# Patient Record
Sex: MALE | Race: WHITE | NOT HISPANIC OR LATINO | Employment: FULL TIME | URBAN - METROPOLITAN AREA
[De-identification: names, ages, dates, MRNs, and addresses within clinical notes are randomized per-mention and may not be internally consistent; named-entity substitution may affect disease eponyms.]

---

## 2017-02-03 ENCOUNTER — OFFICE VISIT (OUTPATIENT)
Dept: SLEEP MEDICINE | Facility: CLINIC | Age: 37
End: 2017-02-03
Attending: NURSE PRACTITIONER
Payer: COMMERCIAL

## 2017-02-03 VITALS
HEART RATE: 90 BPM | OXYGEN SATURATION: 96 % | HEIGHT: 74 IN | BODY MASS INDEX: 31.02 KG/M2 | DIASTOLIC BLOOD PRESSURE: 83 MMHG | RESPIRATION RATE: 16 BRPM | SYSTOLIC BLOOD PRESSURE: 131 MMHG | WEIGHT: 241.7 LBS

## 2017-02-03 DIAGNOSIS — E66.811 OBESITY (BMI 30.0-34.9): ICD-10-CM

## 2017-02-03 DIAGNOSIS — G47.50 PARASOMNIA: ICD-10-CM

## 2017-02-03 DIAGNOSIS — G47.26 SHIFT WORK SLEEP DISORDER: ICD-10-CM

## 2017-02-03 DIAGNOSIS — R06.83 SNORING: Primary | ICD-10-CM

## 2017-02-03 DIAGNOSIS — F45.8 TEETH GRINDING: ICD-10-CM

## 2017-02-03 PROCEDURE — 99211 OFF/OP EST MAY X REQ PHY/QHP: CPT | Mod: ZF

## 2017-02-03 NOTE — PROGRESS NOTES
"Allergies:    Allergies   Allergen Reactions     No Known Drug Allergies        Medications:    Current Outpatient Prescriptions   Medication Sig Dispense Refill     venlafaxine (EFFEXOR-ER) 75 MG TB24 Take 75 mg by mouth daily (with breakfast)         Problem List:  Patient Active Problem List    Diagnosis Date Noted     Essential and other specified forms of tremor 09/09/2003     Concussion 02/03/2003     Problem list name updated by automated process. Provider to review          Past Medical/Surgical History:  Past Medical History   Diagnosis Date     NO ACTIVE PROBLEMS      TMJ (dislocation of temporomandibular joint)      Depressive disorder      No past surgical history on file.        Physical Examination:  Vitals: /83 mmHg  Pulse 90  Resp 16  Ht 1.88 m (6' 2\")  Wt 109.634 kg (241 lb 11.2 oz)  BMI 31.02 kg/m2  SpO2 96%  BMI= Body mass index is 31.02 kg/(m^2).         [place] Total Score 2/3/2017   Total score - [place] 10       GENERAL APPEARANCE: healthy, alert and no distress  EYES: Eyes grossly normal to inspection  HENT: oropharynx crowded, soft palate dependent and nares patent  NECK: thyroid normal to palpation  RESP: lungs clear to auscultation - no rales, rhonchi or wheezes  CV: regular rates and rhythm, normal S1 S2, no S3 or S4 and no murmur, click or rub  Musculoskeletal:  Mallampati Class: IV.  Tonsillar Stage: 1  hidden by pillars.  Dental:         CC: No ref. provider found          "

## 2017-02-03 NOTE — MR AVS SNAPSHOT
"              After Visit Summary   2/3/2017    Burt Graham    MRN: [id number]           Patient Information     Date Of Birth          1980        Visit Information        Provider Department      2/3/2017 10:00 AM Anamaria De Anda APRN [facility], [place], Sleep Study        Today's Diagnoses     Snoring    -  1       Care Instructions    MY TREATMENT INFORMATION FOR SLEEP APNEA-  Burt Graham    NP : Anamaria De Anda  SLEEP CENTER :   [facility]   MY CONTACT NUMBER: [phone]    Home sleep study  Positional device after study  Follow up with me, discuss results and tx options      If I haven't had a sleep study yet, what can I expect?  A personal story from [employer]  https://www.[employer].com/watch?v=AxPLmlRpnCs    Am I having a home sleep study?  Here is a video in case you get home and want to make sure you have done it correctly  https://www.[employer].com/watch?v=NQC9W6qTjj1&feature=youtu.be    Frequently asked questions:  1. What is Obstructive Sleep Apnea (GWENDOLYN)? GWENDOLYN is the most common type of sleep apnea. Apnea literally means, \"without breath.\" It is characterized by repetitive pauses in breathing, despite continued effort to breathe, and is usually associated with a reduction in blood oxygen saturation. Apneas can last 10 to over 60 seconds. It is caused by narrowing or collapse of the upper airway as muscles relax during sleep. Severity of sleep apnea is determined by frequency of breathing events and their effect on your sleep and oxygen levels determined during sleep testing.   2. What are the consequences of GWENDOLYN? Symptoms include: daytime sleepiness- possibly increasing the risk of falling asleep while driving, unrefreshing/restless sleep, snoring, insomnia, waking frequently to urinate, waking with heartburn or reflux, reduced concentration and memory, and morning headaches. Other health consequences may include development of high blood pressure and other cardiovascular disease in " persons who are susceptible. Untreated GWENDOLYN  can contribute to heart disease, stroke and diabetes.   3. What are the treatment options? In most situations, sleep apnea is a lifelong disease that must be managed with daily therapy. Medications are not effective for sleep apnea and surgery is generally not performed until other therapies have been tried. Therapy is usually tailored to the individual patient based on many factors including your wishes as well as severity of sleep apnea and severity of obesity. Continuous Positive Airway (CPAP) is the most reliable treatment. An oral device to hold your jaw forward is usually the next most reliable option. Other options include postioning devices (to keep you off your back), weight loss, and surgery including a tongue pacing device. There is more detail about some of these options below.            1. CPAP-  WHAT DOES IT DO AND HOW CAN I LEARN TO WEAR IT?                               BEFORE I START, CAN I WATCH A MOVIE TO GET A PLAN ON HOW TO USE CPAP?  https://www.[employer].com/watch?s=c6W48id745G      Continuous positive airway pressure, or CPAP, is the most effective treatment for obstructive sleep apnea. It works by blowing room air, through a mask, to hold your throat open. A decision to use CPAP is a major step forward in the pursuit of a healthier life. The successful use of CPAP will help you breathe easier, sleep better and live healthier. You can choose CPAP equipment from any durable medical equipment provider that meets your needs.  Using CPAP can be a positive experience if you keep these sampson points in mind:  1. Commitment  CPAP is not a quick fix for your problem. It involves a long-term commitment to improve your sleep and your health.    2. Communication  Stay in close communication with both your sleep doctor and your CPAP supplier. Ask lots of questions and seek help when you need it.    3. Consistency  Use CPAP all night, every night and for every nap.  "You will receive the maximum health benefits from CPAP when you use it every time that you sleep. This will also make it easier for your body to adjust to the treatment.    4. Correction  The first machine and mask that you try may not be the best ones for you. Work with your sleep doctor and your CPAP supplier to make corrections to your equipment selection. Ask about trying a different type of machine or mask if you have ongoing problems. Make sure that your mask is a good fit and learn to use your equipment properly.    5. Challenge  Tell a family member or close friend to ask you each morning if you used your CPAP the previous night. Have someone to challenge you to give it your best effort.    6. Connection   Your adjustment to CPAP will be easier if you are able to connect with others who use the same treatment. Ask your sleep doctor if there is a support group in your area for people who have sleep apnea, or look for one on the Internet.  7. Comfort   Increase your level of comfort by using a saline spray, decongestant or heated humidifier if CPAP irritates your nose, mouth or throat. Use your unit's \"ramp\" setting to slowly get used to the air pressure level. There may be soft pads you can buy that will fit over your mask straps. Look on www.CPAP.com for accessories that can help make CPAP use more comfortable.  8. Cleaning   Clean your mask, tubing and headgear on a regular basis. Put this time in your schedule so that you don't forget to do it. Check and replace the filters for your CPAP unit and humidifier.    9. Completion   Although you are never finished with CPAP therapy, you should reward yourself by celebrating the completion of your first month of treatment. Expect this first month to be your hardest period of adjustment. It will involve some trial and error as you find the machine, mask and pressure settings that are right for you.    10. Continuation  After your first month of treatment, continue " to make a daily commitment to use your CPAP all night, every night and for every nap.    CPAP-Tips to starting with success:  Begin using your CPAP for short periods of time during the day while you watch TV or read.    Use CPAP every night and for every nap. Using it less often reduces the health benefits and makes it harder for your body to get used to it.    Make small adjustments to your mask, tubing, straps and headgear until you get the right fit. Tightening the mask may actually worsen the leak.  If it leaves significant marks on your face or irritates the bridge of your nose, it may not be the best mask for you.  Speak with the person who supplied the mask and consider trying other masks. Insurances will allow you to try different masks during the first month of starting CPAP.  Insurance also covers a new mask, hose and filter about every 6 months.    Use a saline nasal spray to ease mild nasal congestion. Neti-Pot or saline nasal rinses may also help. Nasal gel sprays can help reduce nasal dryness.  Biotene mouthwash can be helpful to protect your teeth if you experience frequent dry mouth.  Dry mouth may be a sign of air escaping out of your mouth or out of the mask in the case of a full face mask.  Speak with your provider if you expect that is the case.     Take a nasal decongestant to relieve more severe nasal or sinus congestion.  Do not use Afrin (oxymetazoline) nasal spray more than 3 days in a row.  Speak with your sleep doctor if your nasal congestion is chronic.    Use a heated humidifier that fits your CPAP model to enhance your breathing comfort. Adjust the heat setting up if you get a dry nose or throat, down if you get condensation in the hose or mask.  Position the CPAP lower than you so that any condensation in the hose drains back into the machine rather than towards the mask.    Try a system that uses nasal pillows if traditional masks give you problems.    Clean your mask, tubing and  headgear once a week. Make sure the equipment dries fully.    Regularly check and replace the filters for your CPAP unit and humidifier.    Work closely with your sleep provider and your CPAP supplier to make sure that you have the machine, mask and air pressure setting that works best for you. It is better to stop using it and call your provider to solve problems than to lay awake all night frustrated with the device.    BESIDES CPAP, WHAT OTHER THERAPIES ARE THERE?      Positioning Device  Positioning devices are generally used when sleep apnea is mild and only occurs on your back.This example shows a pillow that straps around the waist. It may be appropriate for those whose sleep study shows milder sleep apnea that occurs primarily when lying flat on one's back. Preliminary studies have shown benefit but effectiveness at home may need to be verified by a home sleep test. These devices are generally not covered by medical insurance.                    Positioning Device  Positioning devices are generally used when sleep apnea is mild and only occurs on your back.This example shows a pillow that straps around the waist. It may be appropriate for those whose sleep study shows milder sleep apnea that occurs primarily when lying flat on one's back. Preliminary studies have shown benefit but effectiveness at home may need to be verified by a home sleep test. These devices are generally not covered by medical insurance.                ebay or backpack w/ chest strap stuffed w/ pillow or t shirt 2  pockets sew in tennis balls  Oral Appliance  What is oral appliance therapy?  An oral appliance is a small acrylic device that fits over the upper and lower teeth or tongue (similar to an orthodontic retainer or a mouth guard). This device slightly advances the lower jaw or tongue, which moves the base of the tongue forward, opens the airway, improves breathing and can effectively treat snoring and obstructive sleep apnea  sleep apnea. The appliance is fabricated and customized by a qualified dentist with experience in treating snoring and sleep apnea. Oral appliances are usually well tolerated and have relatively high compliance by patients1, 2, 3.  When is an oral appliance indicated?  Oral appliance therapy is recommended as a first-line treatment for patients with primary snoring, mild sleep apnea, and for patients with moderate sleep apnea who prefer appliance therapy to use of CPAP4, 5. Severity of sleep apnea is determined by sleep testing and is based on the number of respiratory events per hour of sleep.   How successful is oral appliance therapy?  The success rate of oral appliance therapy in patients with mild sleep apnea is 75-80% while in patients with moderate sleep apnea it is 50-70%. The chance of success in patients with severe sleep apnea is 40-50%. The research also shows that oral appliances have a beneficial effect on the cardiovascular health of GWENDOLYN patients at the same magnitude as CPAP therapy7.  Oral appliances should be a second-line treatment in cases of severe sleep apnea, but if not completely successful then a combination therapy utilizing CPAP plus oral appliance therapy may be effective. Oral appliances tend to be effective in a broad range of patients although studies show that the patients who have the highest success are females, younger patients, those with milder disease, and less severe obesity. 3, 6.   The chances of success are lower in patients who have more severe GWENDOLYN, are older, and those who are morbidly obese.     Example of an oral appliance   Finding a dentist that practices dental sleep medicine  Specific training is available through the American Academy of Dental Sleep Medicine for dentists interested in working in the field of sleep. To find a dentist who is educated in the field of sleep and the use of oral appliances, near you, visit the Web site of the American Academy of Dental  Sleep Medicine; also see   http://www.accpstorage.org/newOrganization/patients/oralAppliances.pdf  To search for a dentist certified in these practices:  Http://aadsm.org/FindADentist.aspx?1  1. Madhavi et al. Objectively measured vs self-reported compliance during oral appliance therapy for sleep-disordered breathing. Chest 2013; 144(5): 9123-6448.  2. Ailin, et al. Objective measurement of compliance during oral appliance therapy for sleep-disordered breathing. Thorax 2013; 68(1): 91-96.  3. Radha et al. Mandibular advancement devices in 620 men and women with GWENDOLYN and snoring: tolerability and predictors of treatment success. Chest 2004; 125: 9385-1484.  4. Jossie et al. Oral appliances for snoring and GWENDOLYN: a review. Sleep 2006; 29: 244-262.  5. Nikki, et al. Oral appliance treatment for GWENDOLYN: an update. J Clin Sleep Med 2014; 10(2): 215-227.  6. Nile et al. Predictors of OSAH treatment outcome. J Dent Res 2007; 86: 6173-3556.      Weight Loss:    Weight management is a personal decision.  If you are interested in exploring weight loss strategies, the following discussion covers the impact on weight loss on sleep apnea and the approaches that may be successful.    Weight loss decreases severity of sleep apnea in most people with obesity. For those with mild obesity who have developed snoring with weight gain, even 15-30 pound weight loss can improve and occasionally eliminate sleep apnea.  Structured and life-long dietary and health habits are necessary to lose weight and keep healthier weight levels.     Though there may be significant health benefits from weight loss, long-term weight loss is very difficult to achieve- studies show success with dietary management in less than 10% of people. In addition, substantial weight loss may require years of dietary control and may be difficult if patients have severe obesity. In these cases, surgical management may be considered.  Finally, older  individuals who have tolerated obesity without health complications may be less likely to benefit from weight loss strategies.    Your BMI is Body mass index is 31.02 kg/(m^2).  Body mass index (BMI) is one way to tell whether you are at a healthy weight, overweight, or obese. It measures your weight in relation to your height.  A BMI of 18.5 to 24.9 is in the healthy range. A person with a BMI of 25 to 29.9 is considered overweight, and someone with a BMI of 30 or greater is considered obese. More than two-thirds of American adults are considered overweight or obese.  Being overweight or obese increases the risk for further weight gain. Excess weight may lead to heart disease and diabetes.  Creating and following plans for healthy eating and physical activity may help you improve your health.  Weight control is part of healthy lifestyle and includes exercise, emotional health, and healthy eating habits. Careful eating habits lifelong are the mainstay of weight control. Though there are significant health benefits from weight loss, long-term weight loss with diet alone may be very difficult to achieve- studies show long-term success with dietary management in less than 10% of people. Attaining a healthy weight may be especially difficult to achieve in those with severe obesity. In some cases, medications, devices and surgical management might be considered.  What can you do?  If you are overweight or obese and are interested in methods for weight loss, you should discuss this with your provider.     Consider reducing daily calorie intake by 500 calories.     Keep a food journal.     Avoiding skipping meals, consider cutting portions instead.    Diet combined with exercise helps maintain muscle while optimizing fat loss. Strength training is particularly important for building and maintaining muscle mass. Exercise helps reduce stress, increase energy, and improves fitness. Increasing exercise without diet control,  however, may not burn enough calories to loose weight.       Start walking three days a week 10-20 minutes at a time    Work towards walking thirty minutes five days a week     Eventually, increase the speed of your walking for 1-2 minutes at time    In addition, we recommend that you review healthy lifestyles and methods for weight loss available through the National Institutes of Health patient information sites:  http://win.niddk.nih.gov/publications/index.htm    And look into health and wellness programs that may be available through your health insurance provider, employer, local community center, or frandy club.    Weight management plan: Patient was referred to their PCP to discuss a diet and exercise plan.    Surgery:    Upper Airway Surgery for GWENDOLYN  Surgery for GWENDOLYN is a second-line treatment option in the management of sleep apnea.  Surgery should be considered for patients who are having a difficult time tolerating CPAP.    Surgery for GWENDOLYN is directed at areas that are responsible for narrowing or complete obstruction of the airway during sleep.  There are a wide range of procedures available to enlarge and/or stabilize the airway to prevent blockage of breathing in the three major areas where it can occur: the palate, tongue, and nasal regions.  Successful surgical treatment depends on the accurate identification of the factors responsible for obstructive sleep apnea in each person.  A personalized approach is required because there is no single treatment that works well for everyone.  Because of anatomic variation, consultation with an examination by a sleep surgeon is a critical first step in determining what surgical options are best for each patient.  In some cases, examination during sedation may be recommended in order to guide the selection of procedures.  Patients will be counseled about risks and benefits as well as the typical recovery course after surgery. Surgery is typically not a cure for a  person s GWENDOLYN.  However, surgery will often significantly improve one s GWENDOLYN severity (termed  success rate ).  Even in the absence of a cure, surgery will decrease the cardiovascular risk associated with OSA7; improve overall quality of life8 (sleepiness, functionality, sleep quality, etc).          Palate Procedures:  Patients with GWENDOLYN often have narrowing of their airway in the region of their tonsils and uvula.  The goals of palate procedures are to widen the airway in this region as well as to help the tissues resist collapse.  Modern palate procedure techniques focus on tissue conservation and soft tissue rearrangement, rather than tissue removal.  Often the uvula is preserved in this procedure. Residual sleep apnea is common in patient after pharyngoplasty with an average reduction in sleep apnea events of 33%2.      Tongue Procedures:  While patients are awake, the muscles that surround the throat are active and keep this region open for breathing. These muscles relax during sleep, allowing the tongue and other structures to collapse and block breathing.  There are several different tongue procedures available.  Selection of a tongue base procedure depends on characteristics seen on physical exam.  Generally, procedures are aimed at removing bulky tissues in this area or preventing the back of the tongue from falling back during sleep.  Success rates for tongue surgery range from 50-62%3.    Hypoglossal Nerve Stimulation:  Hypoglossal nerve stimulation has recently received approval from the [place] Food and Drug Administration for the treatment of obstructive sleep apnea.  This is based on research showing that the system was safe and effective in treating sleep apnea6.  Results showed that the median AHI score decreased 68%, from 29.3 to 9.0. This therapy uses an implant system that senses breathing patterns and delivers mild stimulation to airway muscles, which keeps the airway open during sleep.  The  system consists of three fully implanted components: a small generator (similar in size to a pacemaker), a breathing sensor, and a stimulation lead.  Using a small handheld remote, a patient turns the therapy on before bed and off upon awakening.    Candidates for this device must be greater than 22 years of age, have moderate to severe GWENDOLYN (AHI between 20-65), BMI less than 32, have tried CPAP/oral appliance without success, and have appropriate upper airway anatomy (determined by a sleep endoscopy performed by Dr. Araujo).    Hypoglossal Nerve Stimulation Pathway:    The sleep surgeon s office will work with the patient through the insurance prior-authorization process (including communications and appeals).    Nasal Procedures:  Nasal obstruction can interfere with nasal breathing during the day and night.  Studies have shown that relief of nasal obstruction can improve the ability of some patients to tolerate positive airway pressure therapy for obstructive sleep apnea1.  Treatment options include medications such as nasal saline, topical corticosteroid and antihistamine sprays, and oral medications such as antihistamines or decongestants. Non-surgical treatments can include external nasal dilators for selected patients. If these are not successful by themselves, surgery can improve the nasal airway either alone or in combination with these other options.      Combination Procedures:  Combination of surgical procedures and other treatments may be recommended, particularly if patients have more than one area of narrowing or persistent positional disease.  The success rate of combination surgery ranges from 66-80%2,3.      1. Cruz KEVIN. The Role of the Nose in Snoring and Obstructive Sleep Apnoea: An Update.  Eur Arch Otorhinolaryngol. 2011; 268: 1365-73.  2.  Rakel SM; Jes JA; Mary JR; Pallanch JF; Danette MCPHERSON; Marisela SIMMONS; Sonny DENIS. Surgical modifications of the upper airway for obstructive sleep apnea in  adults: a systematic review and meta-analysis. SLEEP 2010;33(10):7036-0491. Roderick JOHNSON. Hypopharyngeal surgery in obstructive sleep apnea: an evidence-based medicine review.  Arch Otolaryngol Head Neck Surg. 2006 Feb;132(2):206-13.  3. Damon SANABRIA, Shannan Y, Dontae LOUIS. The efficacy of anatomically based multilevel surgery for obstructive sleep apnea. Otolaryngol Head Neck Surg. 2003 Oct;129(4):327-35.  4. Kezirian E, Goldberg A. Hypopharyngeal Surgery in Obstructive Sleep Apnea: An Evidence-Based Medicine Review. Arch Otolaryngol Head Neck Surg. 2006 Feb;132(2):206-13.  5. Floyd CARIAS et al. Upper-Airway Stimulation for Obstructive Sleep Apnea.  N Engl J Med. 2014 Jan 9;370(2):139-49.  6. Roxi Y et al. Increased Incidence of Cardiovascular Disease in Middle-aged Men with Obstructive Sleep Apnea. Am J Respir Crit Care Med; 2002 166: 159-165  7. Trudy EM et al. Studying Life Effects and Effectiveness of Palatopharyngoplasty (SLEEP) study: Subjective Outcomes of Isolated Uvulopalatopharyngoplasty. Otolaryngol Head Neck Surg. 2011; 144: 623-631.              Your BMI is Body mass index is 31.02 kg/(m^2).  Weight management is a personal decision.  If you are interested in exploring weight loss strategies, the following discussion covers the approaches that may be successful. Body mass index (BMI) is one way to tell whether you are at a healthy weight, overweight, or obese. It measures your weight in relation to your height.  A BMI of 18.5 to 24.9 is in the healthy range. A person with a BMI of 25 to 29.9 is considered overweight, and someone with a BMI of 30 or greater is considered obese. More than two-thirds of American adults are considered overweight or obese.  Being overweight or obese increases the risk for further weight gain. Excess weight may lead to heart disease and diabetes.  Creating and following plans for healthy eating and physical activity may help you improve your health.  Weight control is part of healthy  lifestyle and includes exercise, emotional health, and healthy eating habits. Careful eating habits lifelong are the mainstay of weight control. Though there are significant health benefits from weight loss, long-term weight loss with diet alone may be very difficult to achieve- studies show long-term success with dietary management in less than 10% of people. Attaining a healthy weight may be especially difficult to achieve in those with severe obesity. In some cases, medications, devices and surgical management might be considered.  What can you do?  If you are overweight or obese and are interested in methods for weight loss, you should discuss this with your provider.     Consider reducing daily calorie intake by 500 calories.     Keep a food journal.     Avoiding skipping meals, consider cutting portions instead.    Diet combined with exercise helps maintain muscle while optimizing fat loss. Strength training is particularly important for building and maintaining muscle mass. Exercise helps reduce stress, increase energy, and improves fitness. Increasing exercise without diet control, however, may not burn enough calories to loose weight.       Start walking three days a week 10-20 minutes at a time    Work towards walking thirty minutes five days a week     Eventually, increase the speed of your walking for 1-2 minutes at time    In addition, we recommend that you review healthy lifestyles and methods for weight loss available through the National Institutes of Health patient information sites:  http://win.niddk.nih.gov/publications/index.htm    And look into health and wellness programs that may be available through your health insurance provider, employer, local community center, or frandy club.    Weight management plan: Patient was referred to their PCP to discuss a diet and exercise plan.            Follow-ups after your visit        Follow-up notes from your care team     Return for hst, follow up with  "me.      Your next 10 appointments already scheduled     Feb 15, 2017 11:00 AM   HST  with SLEEP STUDY RM 7   [facility]    [address] [place] [id number] [phone]              Future tests that were ordered for you today     Open Future Orders        Priority Expected Expires Ordered    HST-Home Sleep Apnea Test Routine  2017 2/3/2017            Who to contact     If you have questions or need follow up information about today's clinic visit or your schedule please contact [facility] directly at [phone].  Normal or non-critical lab and imaging results will be communicated to you by MyChart, letter or phone within 4 business days after the clinic has received the results. If you do not hear from us within 7 days, please contact the clinic through [employer] or phone. If you have a critical or abnormal lab result, we will notify you by phone as soon as possible.  Submit refill requests through [employer] or call your pharmacy and they will forward the refill request to us. Please allow 3 business days for your refill to be completed.          Additional Information About Your Visit        [employer] Information     [employer] lets you send messages to your doctor, view your test results, renew your prescriptions, schedule appointments and more. To sign up, go to [email or web address] . Click on \"Log in\" on the left side of the screen, which will take you to the Welcome page. Then click on \"Sign up Now\" on the right side of the page.     You will be asked to enter the access code listed below, as well as some personal information. Please follow the directions to create your username and password.     Your access code is: [email or web address]  Expires: 2017 10:59 AM     Your access code will  in 90 days. If you need help or a new code, please call your [place] clinic or [phone].        Care " "EveryWhere ID     This is your Care EveryWhere ID. This could be used by other organizations to access your [place] medical records  [id number]        Your Vitals Were     Pulse Respirations Height BMI (Body Mass Index) Pulse Oximetry       90 16 1.88 m (6' 2\") 31.02 kg/m2 96%        Blood Pressure from Last 3 Encounters:   02/03/17 131/83   11/07/15 122/83   12/11/03 126/72    Weight from Last 3 Encounters:   02/03/17 109.634 kg (241 lb 11.2 oz)   11/07/15 104.327 kg (230 lb)   12/11/03 89.177 kg (196 lb 9.6 oz)               Primary Care Provider Office Phone # Fax #    [facility] [phone]       [address] [place] [id number]        Thank you!     Thank you for choosing [facility]EP [facility]  for your care. Our goal is always to provide you with excellent care. Hearing back from our patients is one way we can continue to improve our services. Please take a few minutes to complete the written survey that you may receive in the mail after your visit with us. Thank you!             Your Updated Medication List - Protect others around you: Learn how to safely use, store and throw away your medicines at www.disposemymeds.org.          This list is accurate as of: 2/3/17 10:59 AM.  Always use your most recent med list.                   Brand Name Dispense Instructions for use    venlafaxine 75 MG Tb24 24 hr tablet    EFFEXOR-ER     Take 75 mg by mouth daily (with breakfast)         "

## 2017-02-03 NOTE — PATIENT INSTRUCTIONS
"MY TREATMENT INFORMATION FOR SLEEP APNEA-  Burt Graham    NP : Anamaria Lanza [place]  SLEEP CENTER :   [facility]   MY CONTACT NUMBER: [phone]    Home sleep study  Positional device after study  Follow up with me, discuss results and tx options      If I haven't had a sleep study yet, what can I expect?  A personal story from Red  https://www.Yappsa App Storeube.com/watch?v=AxPLmlRpnCs    Am I having a home sleep study?  Here is a video in case you get home and want to make sure you have done it correctly  https://www.Yappsa App Storeube.com/watch?v=PDC1D3pXpc1&feature=youtu.be    Frequently asked questions:  1. What is Obstructive Sleep Apnea (GWENDOLYN)? GWENDOLYN is the most common type of sleep apnea. Apnea literally means, \"without breath.\" It is characterized by repetitive pauses in breathing, despite continued effort to breathe, and is usually associated with a reduction in blood oxygen saturation. Apneas can last 10 to over 60 seconds. It is caused by narrowing or collapse of the upper airway as muscles relax during sleep. Severity of sleep apnea is determined by frequency of breathing events and their effect on your sleep and oxygen levels determined during sleep testing.   2. What are the consequences of GWENDOLYN? Symptoms include: daytime sleepiness- possibly increasing the risk of falling asleep while driving, unrefreshing/restless sleep, snoring, insomnia, waking frequently to urinate, waking with heartburn or reflux, reduced concentration and memory, and morning headaches. Other health consequences may include development of high blood pressure and other cardiovascular disease in persons who are susceptible. Untreated GWENDOLYN  can contribute to heart disease, stroke and diabetes.   3. What are the treatment options? In most situations, sleep apnea is a lifelong disease that must be managed with daily therapy. Medications are not effective for sleep apnea and surgery is generally not performed until other therapies have been tried. " Therapy is usually tailored to the individual patient based on many factors including your wishes as well as severity of sleep apnea and severity of obesity. Continuous Positive Airway (CPAP) is the most reliable treatment. An oral device to hold your jaw forward is usually the next most reliable option. Other options include postioning devices (to keep you off your back), weight loss, and surgery including a tongue pacing device. There is more detail about some of these options below.            1. CPAP-  WHAT DOES IT DO AND HOW CAN I LEARN TO WEAR IT?                               BEFORE I START, CAN I WATCH A MOVIE TO GET A PLAN ON HOW TO USE CPAP?  https://www.The [employer] Card.com/watch?o=q9S85cb769O      Continuous positive airway pressure, or CPAP, is the most effective treatment for obstructive sleep apnea. It works by blowing room air, through a mask, to hold your throat open. A decision to use CPAP is a major step forward in the pursuit of a healthier life. The successful use of CPAP will help you breathe easier, sleep better and live healthier. You can choose CPAP equipment from any durable medical equipment provider that meets your needs.  Using CPAP can be a positive experience if you keep these sampson points in mind:  1. Commitment  CPAP is not a quick fix for your problem. It involves a long-term commitment to improve your sleep and your health.    2. Communication  Stay in close communication with both your sleep doctor and your CPAP supplier. Ask lots of questions and seek help when you need it.    3. Consistency  Use CPAP all night, every night and for every nap. You will receive the maximum health benefits from CPAP when you use it every time that you sleep. This will also make it easier for your body to adjust to the treatment.    4. Correction  The first machine and mask that you try may not be the best ones for you. Work with your sleep doctor and your CPAP supplier to make corrections to your equipment  "selection. Ask about trying a different type of machine or mask if you have ongoing problems. Make sure that your mask is a good fit and learn to use your equipment properly.    5. Challenge  Tell a family member or close friend to ask you each morning if you used your CPAP the previous night. Have someone to challenge you to give it your best effort.    6. Connection   Your adjustment to CPAP will be easier if you are able to connect with others who use the same treatment. Ask your sleep doctor if there is a support group in your area for people who have sleep apnea, or look for one on the Internet.  7. Comfort   Increase your level of comfort by using a saline spray, decongestant or heated humidifier if CPAP irritates your nose, mouth or throat. Use your unit's \"ramp\" setting to slowly get used to the air pressure level. There may be soft pads you can buy that will fit over your mask straps. Look on www.CPAP.com for accessories that can help make CPAP use more comfortable.  8. Cleaning   Clean your mask, tubing and headgear on a regular basis. Put this time in your schedule so that you don't forget to do it. Check and replace the filters for your CPAP unit and humidifier.    9. Completion   Although you are never finished with CPAP therapy, you should reward yourself by celebrating the completion of your first month of treatment. Expect this first month to be your hardest period of adjustment. It will involve some trial and error as you find the machine, mask and pressure settings that are right for you.    10. Continuation  After your first month of treatment, continue to make a daily commitment to use your CPAP all night, every night and for every nap.    CPAP-Tips to starting with success:  Begin using your CPAP for short periods of time during the day while you watch TV or read.    Use CPAP every night and for every nap. Using it less often reduces the health benefits and makes it harder for your body to get " used to it.    Make small adjustments to your mask, tubing, straps and headgear until you get the right fit. Tightening the mask may actually worsen the leak.  If it leaves significant marks on your face or irritates the bridge of your nose, it may not be the best mask for you.  Speak with the person who supplied the mask and consider trying other masks. Insurances will allow you to try different masks during the first month of starting CPAP.  Insurance also covers a new mask, hose and filter about every 6 months.    Use a saline nasal spray to ease mild nasal congestion. Neti-Pot or saline nasal rinses may also help. Nasal gel sprays can help reduce nasal dryness.  Biotene mouthwash can be helpful to protect your teeth if you experience frequent dry mouth.  Dry mouth may be a sign of air escaping out of your mouth or out of the mask in the case of a full face mask.  Speak with your provider if you expect that is the case.     Take a nasal decongestant to relieve more severe nasal or sinus congestion.  Do not use Afrin (oxymetazoline) nasal spray more than 3 days in a row.  Speak with your sleep doctor if your nasal congestion is chronic.    Use a heated humidifier that fits your CPAP model to enhance your breathing comfort. Adjust the heat setting up if you get a dry nose or throat, down if you get condensation in the hose or mask.  Position the CPAP lower than you so that any condensation in the hose drains back into the machine rather than towards the mask.    Try a system that uses nasal pillows if traditional masks give you problems.    Clean your mask, tubing and headgear once a week. Make sure the equipment dries fully.    Regularly check and replace the filters for your CPAP unit and humidifier.    Work closely with your sleep provider and your CPAP supplier to make sure that you have the machine, mask and air pressure setting that works best for you. It is better to stop using it and call your provider to  solve problems than to lay awake all night frustrated with the device.    BESIDES CPAP, WHAT OTHER THERAPIES ARE THERE?      Positioning Device  Positioning devices are generally used when sleep apnea is mild and only occurs on your back.This example shows a pillow that straps around the waist. It may be appropriate for those whose sleep study shows milder sleep apnea that occurs primarily when lying flat on one's back. Preliminary studies have shown benefit but effectiveness at home may need to be verified by a home sleep test. These devices are generally not covered by medical insurance.                    Positioning Device  Positioning devices are generally used when sleep apnea is mild and only occurs on your back.This example shows a pillow that straps around the waist. It may be appropriate for those whose sleep study shows milder sleep apnea that occurs primarily when lying flat on one's back. Preliminary studies have shown benefit but effectiveness at home may need to be verified by a home sleep test. These devices are generally not covered by medical insurance.                ebay or backpack w/ chest strap stuffed w/ pillow or t shirt 2  pockets sew in tennis balls  Oral Appliance  What is oral appliance therapy?  An oral appliance is a small acrylic device that fits over the upper and lower teeth or tongue (similar to an orthodontic retainer or a mouth guard). This device slightly advances the lower jaw or tongue, which moves the base of the tongue forward, opens the airway, improves breathing and can effectively treat snoring and obstructive sleep apnea sleep apnea. The appliance is fabricated and customized by a qualified dentist with experience in treating snoring and sleep apnea. Oral appliances are usually well tolerated and have relatively high compliance by patients1, 2, 3.  When is an oral appliance indicated?  Oral appliance therapy is recommended as a first-line treatment for patients with  primary snoring, mild sleep apnea, and for patients with moderate sleep apnea who prefer appliance therapy to use of CPAP4, 5. Severity of sleep apnea is determined by sleep testing and is based on the number of respiratory events per hour of sleep.   How successful is oral appliance therapy?  The success rate of oral appliance therapy in patients with mild sleep apnea is 75-80% while in patients with moderate sleep apnea it is 50-70%. The chance of success in patients with severe sleep apnea is 40-50%. The research also shows that oral appliances have a beneficial effect on the cardiovascular health of GWENDOLYN patients at the same magnitude as CPAP therapy7.  Oral appliances should be a second-line treatment in cases of severe sleep apnea, but if not completely successful then a combination therapy utilizing CPAP plus oral appliance therapy may be effective. Oral appliances tend to be effective in a broad range of patients although studies show that the patients who have the highest success are females, younger patients, those with milder disease, and less severe obesity. 3, 6.   The chances of success are lower in patients who have more severe GWENDOLYN, are older, and those who are morbidly obese.     Example of an oral appliance   Finding a dentist that practices dental sleep medicine  Specific training is available through the American Academy of Dental Sleep Medicine for dentists interested in working in the field of sleep. To find a dentist who is educated in the field of sleep and the use of oral appliances, near you, visit the Web site of the American Academy of Dental Sleep Medicine; also see   http://www.accpstorage.org/newOrganization/patients/oralAppliances.pdf  To search for a dentist certified in these practices:  Http://aadsm.org/FindADentist.aspx?1  1. Madhavi et al. Objectively measured vs self-reported compliance during oral appliance therapy for sleep-disordered breathing. Chest 2013; 144(5):  9965-9686.  2. Ailin et al. Objective measurement of compliance during oral appliance therapy for sleep-disordered breathing. Thorax 2013; 68(1): 91-96.  3. Radha et al. Mandibular advancement devices in 620 men and women with GWENDOLYN and snoring: tolerability and predictors of treatment success. Chest 2004; 125: 5544-4460.  4. Jossie, et al. Oral appliances for snoring and GWENDOLYN: a review. Sleep 2006; 29: 244-262.  5. Nikki et al. Oral appliance treatment for GWENDOLYN: an update. J Clin Sleep Med 2014; 10(2): 215-227.  6. Nile et al. Predictors of OSAH treatment outcome. J Dent Res 2007; 86: 0984-6547.      Weight Loss:    Weight management is a personal decision.  If you are interested in exploring weight loss strategies, the following discussion covers the impact on weight loss on sleep apnea and the approaches that may be successful.    Weight loss decreases severity of sleep apnea in most people with obesity. For those with mild obesity who have developed snoring with weight gain, even 15-30 pound weight loss can improve and occasionally eliminate sleep apnea.  Structured and life-long dietary and health habits are necessary to lose weight and keep healthier weight levels.     Though there may be significant health benefits from weight loss, long-term weight loss is very difficult to achieve- studies show success with dietary management in less than 10% of people. In addition, substantial weight loss may require years of dietary control and may be difficult if patients have severe obesity. In these cases, surgical management may be considered.  Finally, older individuals who have tolerated obesity without health complications may be less likely to benefit from weight loss strategies.    Your BMI is Body mass index is 31.02 kg/(m^2).  Body mass index (BMI) is one way to tell whether you are at a healthy weight, overweight, or obese. It measures your weight in relation to your height.  A BMI of 18.5  to 24.9 is in the healthy range. A person with a BMI of 25 to 29.9 is considered overweight, and someone with a BMI of 30 or greater is considered obese. More than two-thirds of American adults are considered overweight or obese.  Being overweight or obese increases the risk for further weight gain. Excess weight may lead to heart disease and diabetes.  Creating and following plans for healthy eating and physical activity may help you improve your health.  Weight control is part of healthy lifestyle and includes exercise, emotional health, and healthy eating habits. Careful eating habits lifelong are the mainstay of weight control. Though there are significant health benefits from weight loss, long-term weight loss with diet alone may be very difficult to achieve- studies show long-term success with dietary management in less than 10% of people. Attaining a healthy weight may be especially difficult to achieve in those with severe obesity. In some cases, medications, devices and surgical management might be considered.  What can you do?  If you are overweight or obese and are interested in methods for weight loss, you should discuss this with your provider.     Consider reducing daily calorie intake by 500 calories.     Keep a food journal.     Avoiding skipping meals, consider cutting portions instead.    Diet combined with exercise helps maintain muscle while optimizing fat loss. Strength training is particularly important for building and maintaining muscle mass. Exercise helps reduce stress, increase energy, and improves fitness. Increasing exercise without diet control, however, may not burn enough calories to loose weight.       Start walking three days a week 10-20 minutes at a time    Work towards walking thirty minutes five days a week     Eventually, increase the speed of your walking for 1-2 minutes at time    In addition, we recommend that you review healthy lifestyles and methods for weight loss  available through the National Institutes of Health patient information sites:  http://win.niddk.nih.gov/publications/index.htm    And look into health and wellness programs that may be available through your health insurance provider, employer, local community center, or frandy club.    Weight management plan: Patient was referred to their PCP to discuss a diet and exercise plan.    Surgery:    Upper Airway Surgery for GWENDOLYN  Surgery for GWENDOLYN is a second-line treatment option in the management of sleep apnea.  Surgery should be considered for patients who are having a difficult time tolerating CPAP.    Surgery for GWENDOLYN is directed at areas that are responsible for narrowing or complete obstruction of the airway during sleep.  There are a wide range of procedures available to enlarge and/or stabilize the airway to prevent blockage of breathing in the three major areas where it can occur: the palate, tongue, and nasal regions.  Successful surgical treatment depends on the accurate identification of the factors responsible for obstructive sleep apnea in each person.  A personalized approach is required because there is no single treatment that works well for everyone.  Because of anatomic variation, consultation with an examination by a sleep surgeon is a critical first step in determining what surgical options are best for each patient.  In some cases, examination during sedation may be recommended in order to guide the selection of procedures.  Patients will be counseled about risks and benefits as well as the typical recovery course after surgery. Surgery is typically not a cure for a person s GWENDOLYN.  However, surgery will often significantly improve one s GWENDOLYN severity (termed  success rate ).  Even in the absence of a cure, surgery will decrease the cardiovascular risk associated with OSA7; improve overall quality of life8 (sleepiness, functionality, sleep quality, etc).          Palate Procedures:  Patients with GWENDOLYN  often have narrowing of their airway in the region of their tonsils and uvula.  The goals of palate procedures are to widen the airway in this region as well as to help the tissues resist collapse.  Modern palate procedure techniques focus on tissue conservation and soft tissue rearrangement, rather than tissue removal.  Often the uvula is preserved in this procedure. Residual sleep apnea is common in patient after pharyngoplasty with an average reduction in sleep apnea events of 33%2.      Tongue Procedures:  While patients are awake, the muscles that surround the throat are active and keep this region open for breathing. These muscles relax during sleep, allowing the tongue and other structures to collapse and block breathing.  There are several different tongue procedures available.  Selection of a tongue base procedure depends on characteristics seen on physical exam.  Generally, procedures are aimed at removing bulky tissues in this area or preventing the back of the tongue from falling back during sleep.  Success rates for tongue surgery range from 50-62%3.    Hypoglossal Nerve Stimulation:  Hypoglossal nerve stimulation has recently received approval from the [place] Food and Drug Administration for the treatment of obstructive sleep apnea.  This is based on research showing that the system was safe and effective in treating sleep apnea6.  Results showed that the median AHI score decreased 68%, from 29.3 to 9.0. This therapy uses an implant system that senses breathing patterns and delivers mild stimulation to airway muscles, which keeps the airway open during sleep.  The system consists of three fully implanted components: a small generator (similar in size to a pacemaker), a breathing sensor, and a stimulation lead.  Using a small handheld remote, a patient turns the therapy on before bed and off upon awakening.    Candidates for this device must be greater than 22 years of age, have moderate to  severe GWENDOLYN (AHI between 20-65), BMI less than 32, have tried CPAP/oral appliance without success, and have appropriate upper airway anatomy (determined by a sleep endoscopy performed by Dr. Araujo).    Hypoglossal Nerve Stimulation Pathway:    The sleep surgeon s office will work with the patient through the insurance prior-authorization process (including communications and appeals).    Nasal Procedures:  Nasal obstruction can interfere with nasal breathing during the day and night.  Studies have shown that relief of nasal obstruction can improve the ability of some patients to tolerate positive airway pressure therapy for obstructive sleep apnea1.  Treatment options include medications such as nasal saline, topical corticosteroid and antihistamine sprays, and oral medications such as antihistamines or decongestants. Non-surgical treatments can include external nasal dilators for selected patients. If these are not successful by themselves, surgery can improve the nasal airway either alone or in combination with these other options.      Combination Procedures:  Combination of surgical procedures and other treatments may be recommended, particularly if patients have more than one area of narrowing or persistent positional disease.  The success rate of combination surgery ranges from 66-80%2,3.      1. Cruz KEVIN. The Role of the Nose in Snoring and Obstructive Sleep Apnoea: An Update.  Eur Arch Otorhinolaryngol. 2011; 268: 1365-73.  2.  Rakel SM; Jes JA; Mary JR; Pallanch JF; Danette MB; Marisela SG; Sonny VALENTINED. Surgical modifications of the upper airway for obstructive sleep apnea in adults: a systematic review and meta-analysis. SLEEP 2010;33(10):5473-8916. Roderick JOHNSON. Hypopharyngeal surgery in obstructive sleep apnea: an evidence-based medicine review.  Arch Otolaryngol Head Neck Surg. 2006 Feb;132(2):206-13.  3. Damon YH1, Shannan Y, Dontae LOUIS. The efficacy of anatomically based multilevel surgery for obstructive  sleep apnea. Otolaryngol Head Neck Surg. 2003 Oct;129(4):327-35.  4. Kezirian E, Goldberg A. Hypopharyngeal Surgery in Obstructive Sleep Apnea: An Evidence-Based Medicine Review. Arch Otolaryngol Head Neck Surg. 2006 Feb;132(2):206-13.  5. Floyd CARIAS et al. Upper-Airway Stimulation for Obstructive Sleep Apnea.  N Engl J Med. 2014 Jan 9;370(2):139-49.  6. Roxi Y et al. Increased Incidence of Cardiovascular Disease in Middle-aged Men with Obstructive Sleep Apnea. Am J Respir Crit Care Med; 2002 166: 159-165  7. Yates EM et al. Studying Life Effects and Effectiveness of Palatopharyngoplasty (SLEEP) study: Subjective Outcomes of Isolated Uvulopalatopharyngoplasty. Otolaryngol Head Neck Surg. 2011; 144: 623-631.              Your BMI is Body mass index is 31.02 kg/(m^2).  Weight management is a personal decision.  If you are interested in exploring weight loss strategies, the following discussion covers the approaches that may be successful. Body mass index (BMI) is one way to tell whether you are at a healthy weight, overweight, or obese. It measures your weight in relation to your height.  A BMI of 18.5 to 24.9 is in the healthy range. A person with a BMI of 25 to 29.9 is considered overweight, and someone with a BMI of 30 or greater is considered obese. More than two-thirds of American adults are considered overweight or obese.  Being overweight or obese increases the risk for further weight gain. Excess weight may lead to heart disease and diabetes.  Creating and following plans for healthy eating and physical activity may help you improve your health.  Weight control is part of healthy lifestyle and includes exercise, emotional health, and healthy eating habits. Careful eating habits lifelong are the mainstay of weight control. Though there are significant health benefits from weight loss, long-term weight loss with diet alone may be very difficult to achieve- studies show long-term success with dietary management  in less than 10% of people. Attaining a healthy weight may be especially difficult to achieve in those with severe obesity. In some cases, medications, devices and surgical management might be considered.  What can you do?  If you are overweight or obese and are interested in methods for weight loss, you should discuss this with your provider.     Consider reducing daily calorie intake by 500 calories.     Keep a food journal.     Avoiding skipping meals, consider cutting portions instead.    Diet combined with exercise helps maintain muscle while optimizing fat loss. Strength training is particularly important for building and maintaining muscle mass. Exercise helps reduce stress, increase energy, and improves fitness. Increasing exercise without diet control, however, may not burn enough calories to loose weight.       Start walking three days a week 10-20 minutes at a time    Work towards walking thirty minutes five days a week     Eventually, increase the speed of your walking for 1-2 minutes at time    In addition, we recommend that you review healthy lifestyles and methods for weight loss available through the National Institutes of Health patient information sites:  http://win.niddk.nih.gov/publications/index.htm    And look into health and wellness programs that may be available through your health insurance provider, employer, local community center, or frandy club.    Weight management plan: Patient was referred to their PCP to discuss a diet and exercise plan.

## 2017-02-03 NOTE — NURSING NOTE
"Chief Complaint   Patient presents with     Consult     Snoring, sleep apnea       Initial /83 mmHg  Pulse 90  Resp 16  Ht 1.88 m (6' 2\")  Wt 109.634 kg (241 lb 11.2 oz)  BMI 31.02 kg/m2  SpO2 96% Estimated body mass index is 31.02 kg/(m^2) as calculated from the following:    Height as of this encounter: 1.88 m (6' 2\").    Weight as of this encounter: 109.634 kg (241 lb 11.2 oz).  BP completed using cuff size: large  Right arm  Neck 43cm    .Genevieve VARGAS      "

## 2017-02-05 NOTE — PROGRESS NOTES
DATE OF VISIT:  02/03/2017      [facility]      CHIEF COMPLAINT:  Burt Graham self-refers for problems with snoring, witnessed apneas and daytime sleepiness along with headaches.      HISTORY OF PRESENT ILLNESS:  Reports a 3-year history of these symptoms which have paralleled a 20- to 25-pound weight gain.  This is his first evaluation for these concerns.  He reports that there is loud snoring nightly that can be heard outside the bedroom closed door.  He does awaken with snort arousals and has been observed to have witnessed pauses by his bed partner.  He is frequently elbowed for snoring and pausing but does not come to a full wakeup.  He awakens in the morning with a dry throat, does chronically have problems with breathing through his nose and sinus pain.  Sleeps on back and sides.  He has no signs of orexin deficiency.  He does, however, clench or grind his teeth and does awaken with a headache every day.  He did have a traumatic blow to the upper jaw and had a surgery with reimplantation of his teeth, and alignment of teeth of his teeth is tortuous.  Reports a nightmare once a month and actually night terror.  He does not fully come to awake state but may awaken yelling.      SLEEP SCHEDULE:  Enters bed depending on shift currently being worked.  When he works the day shifts, entered bed at 10:30 at night and exits bed at approximately 5:00 in the morning.  When he works evening shift, enters bed at about midnight and exits bed at about 8 a.m.  He will do 3 days, 3 evenings and then 3 off.  Sleep latency is 30 minutes.  He does not come to full awakening throughout the night.  If he should, it takes about 5 minutes to reinitiate sleep.  He wakes up because his wife elbows him.  Symptoms of snoring and apneas are worse supine, but he does have them also on his right side.  Total sleep time he is estimating somewhere between 5 hours on work nights to 8 hours on weekends.   Feels his best, he thinks, if he gets 10.  May nap for an hour once a week for about 20 minutes.  Activities in bed include a half hour of TV from, I believe, a computer by timer.  No restless legs syndrome.      SOCIAL AND PERSONAL HISTORY:  He is , lives with his wife, 2 children, works as a .  Sleep environment is conducive to good sleep with the exception of bed partner and elbowing.  Family history of sleep apnea with mother in her early 60s.  No use of alcohol to assist with falling asleep.  No over-the-counter meds to assist with sleep.  Caffeine intake is greater than 3 servings but over with prior to 6 hours of bedtime.  [place] Sleepiness Scale is 10/24.  Reports that sleepiness does affect his work on the job, feeling tired, perhaps decreased focus at times and also decreased motivation, which can be poor state to be in with a slight edge of danger to his position.  No recent problems with driving; 28/30 days tired and fatigued; 10/30 days does report a mild low mood and is getting help with that, seeing a psychiatrist.  He is also on Effexor ER.  Anxiety is a little bit less than it has been in the past.      REVIEW OF SYSTEMS:  A 14-point comprehensive review of systems has been completed and negative with the exception of the following:   EAR, NOSE AND THROAT:  Sinus pain, currently is more of a dryness.   NERVOUS SYSTEM:  Headaches in the morning.   PULMONARY:  Shortness of breath with activity but easily can do 2 flights of stairs without stopping.  Occasional dry cough.   DIGESTIVE:  Occasional diarrhea and belly pain.   MUSCLES AND BONES:  Lower back problems, muscle pain and bone pain.   MENTAL HEALTH:  Depression and anxiety.      SURGICAL HISTORY:  Oral surgery after an accident; that was 26 years ago when he was 10.        Allergies:    Allergies   Allergen Reactions     No Known Drug Allergies        Medications:    Current Outpatient Prescriptions   Medication Sig  "Dispense Refill     venlafaxine (EFFEXOR-ER) 75 MG TB24 Take 75 mg by mouth daily (with breakfast)         Problem List:  Patient Active Problem List    Diagnosis Date Noted     Essential and other specified forms of tremor 09/09/2003     Concussion 02/03/2003     Problem list name updated by automated process. Provider to review          Past Medical/Surgical History:  Past Medical History   Diagnosis Date     NO ACTIVE PROBLEMS      TMJ (dislocation of temporomandibular joint)      Depressive disorder      No past surgical history on file.        Physical Examination:  Vitals: /83 mmHg  Pulse 90  Resp 16  Ht 1.88 m (6' 2\")  Wt 109.634 kg (241 lb 11.2 oz)  BMI 31.02 kg/m2  SpO2 96%  BMI= Body mass index is 31.02 kg/(m^2).         Hastings Total Score 2/3/2017   Total score - Hastings 10       GENERAL APPEARANCE: healthy, alert and no distress  EYES: Eyes grossly normal to inspection  HENT: oropharynx crowded, soft palate dependent and nares patent  NECK: thyroid normal to palpation  RESP: lungs clear to auscultation - no rales, rhonchi or wheezes  CV: regular rates and rhythm, normal S1 S2, no S3 or S4 and no murmur, click or rub  Extremities are without clubbing, edema  Musculoskeletal:  Mallampati Class: IV.  Tonsillar Stage: 1  hidden by pillars.  Dental: mild class III jaw  Skin: without facial rash    ASSESSMENT AND PLAN:  It is my impression that Mr. Graham, who has a pretest probability that is moderate for obstructive sleep apnea with a STOP-Bang score of 5, would be a good candidate for an HST, since he has infrequent arousals from sleep throughout the night despite doing shift work, and the following plan of care has been developed:   1.  Snoring with witnessed apneas and daytime sleepiness and fatigue.  I have ordered an HST to occur at a time where he is able to provide his most regular shift of to our study.  He will see me following his study.  We did look at modalities of treatment and he " does indicate that, I believe, he is open to most modalities.  I do have concerns with regard to his qualifications for a mandibular advancement device, since he appears to be somewhat anteriorly advanced with a class III jawline and lower teeth slightly overlapping his upper.  He also has significant grinding and clenching and may benefit from seeing a sleep dentist in that regard.  I will see him back in clinic and review his results once we have results.  2.  Obesity:  His symptoms have developed over a weight gain and he is encouraged to lose weight and is interested in moving forward in that regard.   3.  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   4.  Grinding and clenching of teeth.  As mentioned earlier, should see a sleep dentist somewhere down the road, despite whatever modality of treatment he does choose.   5.  Parasomnias, infrequent confusional night terrors, not leaving bed often, just lying and yelling or screaming.  These are infrequent but, perhaps, with treatment of possible sleep disorder, breathing and/or improvement in decreasing grinding or clenching may limit triggers for this behavior.      Thank you for allowing me to participate in this kind man's care.      Time spent with patient 60 minutes of which greater than 50% was spent in counseling, education and coordination of care.         ANGELO RIOS, CNP             D: 2017 18:29   T: 2017 19:37   MT: TD      Name:     IRLANDA ZARAGOZA   MRN:      [id number]        Account:      [id number]   :      1980           Visit Date:   2017      Document: [id number]

## 2017-02-15 ENCOUNTER — OFFICE VISIT (OUTPATIENT)
Dept: SLEEP MEDICINE | Facility: CLINIC | Age: 37
End: 2017-02-15
Attending: INTERNAL MEDICINE
Payer: COMMERCIAL

## 2017-02-15 DIAGNOSIS — R06.83 SNORING: ICD-10-CM

## 2017-02-15 PROCEDURE — G0399 HOME SLEEP TEST/TYPE 3 PORTA: HCPCS | Mod: ZF

## 2017-02-15 NOTE — PATIENT INSTRUCTIONS
My home sleep study:    ______I will activate the device as shown on the video    __X____My device is programmed to start automatically at     ____11P.M__________ Time   on ___02/15/2017___________  Day/Date    My contact number if I have problems is ____[phone]________________________      I will watch the video before I hook it up at night: [email or web address]

## 2017-02-15 NOTE — PROGRESS NOTES
Patient presented to clinic for  and demonstration of the HST. Patient was set up and instructed use. Patient verbalized understanding and will be returning device after 8am tomorrow    Patient was given sleep logs and written instructions for use    TWIN Caal  Clinic Coordinator   Registered Medical Assistant   [facility]

## 2017-02-15 NOTE — MR AVS SNAPSHOT
"              After Visit Summary   2/15/2017    Burt Graham    MRN: [id number]           Patient Information     Date Of Birth          1980        Visit Information        Provider Department      2/15/2017 11:00 AM SLEEP STUDY [facility]        Today's Diagnoses     Snoring          Care Instructions    My home sleep study:    ______I will activate the device as shown on the video    __X____My device is programmed to start automatically at     ____11P.M__________ Time   on ___02/15/2017___________  Day/Date    My contact number if I have problems is ____[phone]________________________      I will watch the video before I hook it up at night: [email or web address]                Follow-ups after your visit        Who to contact     If you have questions or need follow up information about today's clinic visit or your schedule please contact [facility] directly at [phone].  Normal or non-critical lab and imaging results will be communicated to you by MyChart, letter or phone within 4 business days after the clinic has received the results. If you do not hear from us within 7 days, please contact the clinic through [facility] or phone. If you have a critical or abnormal lab result, we will notify you by phone as soon as possible.  Submit refill requests through testbirds or call your pharmacy and they will forward the refill request to us. Please allow 3 business days for your refill to be completed.          Additional Information About Your Visit        [employer] Information     testbirds lets you send messages to your doctor, view your test results, renew your prescriptions, schedule appointments and more. To sign up, go to [email or web address] . Click on \"Log in\" on the left side of the screen, which will take you to the Welcome page. Then click on \"Sign up Now\" on the right side of the page.     You will be asked to enter the access code listed below, as " well as some personal information. Please follow the directions to create your username and password.     Your access code is: [email or web address]  Expires: 2017 10:59 AM     Your access code will  in 90 days. If you need help or a new code, please call your [facility] clinic or [phone].        Care EveryWhere ID     This is your Care EveryWhere ID. This could be used by other organizations to access your [facility] medical records  [id number]         Blood Pressure from Last 3 Encounters:   17 131/83   11/07/15 122/83   03 126/72    Weight from Last 3 Encounters:   17 109.6 kg (241 lb 11.2 oz)   11/07/15 104.3 kg (230 lb)   03 89.2 kg (196 lb 9.6 oz)              We Performed the Following     HST-Home Sleep Apnea Test        Primary Care Provider Office Phone # Fax #    [facility] [phone]       [address] [place] [id number]        Thank you!     Thank you for choosing [facility], SLEEP STUDY  for your care. Our goal is always to provide you with excellent care. Hearing back from our patients is one way we can continue to improve our services. Please take a few minutes to complete the written survey that you may receive in the mail after your visit with us. Thank you!             Your Updated Medication List - Protect others around you: Learn how to safely use, store and throw away your medicines at www.disposemymeds.org.          This list is accurate as of: 2/15/17 11:04 AM.  Always use your most recent med list.                   Brand Name Dispense Instructions for use    venlafaxine 75 MG Tb24 24 hr tablet    EFFEXOR-ER     Take 75 mg by mouth daily (with breakfast)

## 2017-02-16 ENCOUNTER — TELEPHONE (OUTPATIENT)
Dept: SLEEP MEDICINE | Facility: CLINIC | Age: 37
End: 2017-02-16

## 2017-02-16 DIAGNOSIS — G47.33 OSA (OBSTRUCTIVE SLEEP APNEA): ICD-10-CM

## 2017-02-16 NOTE — PROCEDURES
"HOME SLEEP STUDY INTERPRETATION    Patient: Burt Graham  MRN: [id number]  YOB: 1980  Study Date: 2/15/2017  Referring Provider: Roberta, Shade Zurita;   Ordering Provider: Anamaria De Anda CNP     Indications for Home Study: Burt Graham is a 37 year old male with a history of depresion who presents with symptoms suggestive of obstructive sleep apnea.    Estimated body mass index is 31.03 kg/(m^2) as calculated from the following:    Height as of 2/3/17: 1.88 m (6' 2\").    Weight as of 2/3/17: 109.6 kg (241 lb 11.2 oz).  Total score - [place]: 10 (2/3/2017  9:00 AM)  STOP-BAN/8    Data: A full night home sleep study was performed recording the standard physiologic parameters including body position, movement, sound, nasal pressure, thermal oral airflow, chest and abdominal movements with respiratory inductance plethysmography, and oxygen saturation by pulse oximetry. Pulse rate was estimated by oximetry recording. This study was considered adequate based on > 4 hours of quality oximetry and respiratory recording. As specified by the AASM Manual for the Scoring of Sleep and Associated events, version 2.3, Rule VIII.D 1B, 4% oxygen desaturation scoring for hypopneas is used as a standard of care on all home sleep apnea testing.    Analysis Time:  438 minutes    Respiration:   Sleep Associated Hypoxemia: sustained hypoxemia was present. Baseline oxygen saturation was 93%.  Time with saturation less than or equal to 88% was 47 minutes. The lowest oxygen saturation was 77%.   Snoring: Snoring was present.  Respiratory events: The home study revealed a presence of 73 obstructive apneas and 23 mixed and central apneas. There were 158 hypopneas resulting in a combined apnea/hypopnea index [AHI] of 35 events per hour.  AHI was 42 per hour supine,  10 per hour on left side, and 23 per hour on right side.   Pattern: Excluding events noted above, respiratory rate and pattern was Normal.    Position: Percent of " time spent: supine - 71%, prone - 0%, on left - 11%, on right - 18%.    Heart Rate: By pulse oximetry normal rate was noted.     Assessment:   Severe obstructive sleep apnea.  Sleep associated hypoxemia was present.    Recommendations:  Consider auto-CPAP at 5-15 cmH2O.  Suggest optimizing sleep hygiene and avoiding sleep deprivation.  Weight management.    Diagnosis Code(s): Obstructive Sleep Apnea G47.33, Hypoxemia G47.36    Radha rFy MD, February 16, 2017   Diplomate, American Board of Internal Medicine, Sleep Medicine

## 2017-02-16 NOTE — TELEPHONE ENCOUNTER
I called tp to review HST results as it showed severe obstructive sleep apnea.  AHI 38 with associated hypoxemia.     Pt does not have follow up appt.   I counseled the pt on the importance of treating eOSA and recommended CPAP.  Pt interested in proceeding  Review Sleep therapy management program.   Placing orders and pt will need follow up per [facility] protocol with Anamaria De Anda.    Radha Fry M.D.  Pulmonary/Critical Care/Sleep Medicine

## 2017-02-16 NOTE — PROGRESS NOTES
This HST performed using a Noxturnal T3 device which recorded snore, sound, movement activity, body position, nasal pressure, oronasal thermal airflow, pulse, oximetry and both chest and abdominal respiratory effort. HST data was confined to the time patients states they were in bed.   Patient was score using 1B rules. Patient respiratory events showed an AHI 34.8 with loud snoring. Patient SP02 below 89% was 47.3 minutes. Overall signal quality was good.    Pt will follow up with sleep provider to determine appropriate therapy.

## 2017-02-20 ENCOUNTER — TELEPHONE (OUTPATIENT)
Dept: SLEEP MEDICINE | Facility: CLINIC | Age: 37
End: 2017-02-20

## 2017-02-24 ENCOUNTER — MYC MEDICAL ADVICE (OUTPATIENT)
Dept: SLEEP MEDICINE | Facility: CLINIC | Age: 37
End: 2017-02-24

## 2017-03-01 NOTE — TELEPHONE ENCOUNTER
Spoke with pt to clarify information regarding cpap download.  Pt informed that he will need to contact [email or web address] to find out where machine can be downloaded.  Once machine has been downloaded a copy of the download needs to be faxed to clinic.    TWIN Carter

## 2017-03-01 NOTE — TELEPHONE ENCOUNTER
From: Anamaria De Anda APRN CNP  To: Burt Gdowine  Sent: 2/24/2017 4:47 PM CST  Subject: sleep study    Hi Mr. Graham,    Dr. Fry did review the results of your sleep study, and you had an appointment to be set up on cpap. I see that you were unable to keep that appointment. Since your sleep apnea is in the severe range, along with low oxygen levels, we do wish to see that you do obtain treatment for this condition. Please let me know how we can be of help.     Sincerely,      ANGELO Minor, CNP-BC  Sleep Medicine

## 2017-03-03 ENCOUNTER — TELEPHONE (OUTPATIENT)
Dept: SLEEP MEDICINE | Facility: CLINIC | Age: 37
End: 2017-03-03

## 2017-03-06 NOTE — TELEPHONE ENCOUNTER
Adding sleep therapy management for support, if possible with purchase of device from [email or web address] in setting of pt with severe mary ann with nocturnal hypoxemia.    Thanks for your help.

## 2017-04-03 ENCOUNTER — DOCUMENTATION ONLY (OUTPATIENT)
Dept: SLEEP MEDICINE | Facility: CLINIC | Age: 37
End: 2017-04-03

## 2017-04-03 NOTE — PROGRESS NOTES
Orders from [facility] for apap 5-16, and supplies.  Will ask stem to follow, will need follow up in 8 wks.

## 2017-04-04 ENCOUNTER — TELEPHONE (OUTPATIENT)
Dept: SLEEP MEDICINE | Facility: CLINIC | Age: 37
End: 2017-04-04

## 2017-04-04 NOTE — TELEPHONE ENCOUNTER
----- Message from ANGELO Bright CNP sent at 4/3/2017  7:43 AM CDT -----  Will have cpap likely in 1-2 wks.  STM please and follow up with me in 8 wks

## 2019-11-03 ENCOUNTER — HEALTH MAINTENANCE LETTER (OUTPATIENT)
Age: 39
End: 2019-11-03

## 2020-01-13 NOTE — TELEPHONE ENCOUNTER
Orders from [email or web address] have been rev'd, signed, faxed back and scanned into chart.    TWIN Carter       Pharmacy Medication History  Admission medication history interview status for the 1/13/2020  admission is complete. See EPIC admission navigator for prior to admission medications     Medication history sources: [facility] ([facility]  Medication history source reliability: Good  Adherence assessment: Good    Significant changes made to the medication list:  none      Additional medication history information:   none    Medication reconciliation completed by provider prior to medication history? No    Time spent in this activity: 18min      Prior to Admission medications    Medication Sig Last Dose Taking? Auth Provider   acetaminophen (TYLENOL) 325 MG tablet Take 650 mg by mouth every 4 hours as needed for mild pain  Yes Unknown, Entered By History   acetaminophen (TYLENOL) 500 MG tablet Take 1,000 mg by mouth 3 times daily 1/12/2020 at pm Yes Unknown, Entered By History   amoxicillin-clavulanate (AUGMENTIN) 875-125 MG tablet Take 1 tablet by mouth 2 times daily For 5 days 1/12/2020 at pm Yes Unknown, Entered By History   azithromycin (ZITHROMAX) 250 MG tablet Take by mouth At Bedtime For 4 doses 1/12/2020 at Unknown time Yes Unknown, Entered By History   calcium carbonate-vitamin D (CALCIUM + D) 600-200 MG-UNIT TABS Take 1 tablet by mouth 2 times daily 1/12/2020 at pm Yes Pamela Chen MD   FAMOTIDINE PO Take 20 mg by mouth At Bedtime 1/12/2020 at hs Yes Reported, Patient   hydrALAZINE (APRESOLINE) 10 MG tablet Take 1 tablet (10 mg) by mouth daily as needed (SBP > 160) prn Yes Lucía Mendoza APRN CNP   HYDROXYZINE HCL PO Take 10 mg by mouth At Bedtime  1/12/2020 at hs Yes Reported, Patient   MELATONIN PO Take 3 mg by mouth At Bedtime 1/12/2020 at hs Yes Reported, Patient   METOPROLOL TARTRATE PO Take 50 mg by mouth 2 times daily 1/12/2020 at pm Yes Reported, Patient   pantoprazole (PROTONIX) 40 MG EC tablet Take 1 tablet (40 mg) by mouth every morning (before breakfast) 1/12/2020 at am Yes Junito Jaramillo  MD Pamela   simvastatin (ZOCOR) 20 MG tablet Take 1 tablet (20 mg) by mouth At Bedtime 1/12/2020 at hs Yes Pamela Chen MD   vitamin D3 (CHOLECALCIFEROL) 1000 units (25 mcg) tablet Take 1 tablet (1,000 Units) by mouth daily 1/12/2020 at am Yes Pamela Chen MD

## 2020-11-16 ENCOUNTER — HEALTH MAINTENANCE LETTER (OUTPATIENT)
Age: 40
End: 2020-11-16

## 2021-09-18 ENCOUNTER — HEALTH MAINTENANCE LETTER (OUTPATIENT)
Age: 41
End: 2021-09-18

## 2022-01-08 ENCOUNTER — HEALTH MAINTENANCE LETTER (OUTPATIENT)
Age: 42
End: 2022-01-08

## 2022-01-25 ENCOUNTER — OFFICE VISIT (OUTPATIENT)
Dept: FAMILY MEDICINE | Facility: CLINIC | Age: 42
End: 2022-01-25
Payer: COMMERCIAL

## 2022-01-25 ENCOUNTER — TELEPHONE (OUTPATIENT)
Dept: FAMILY MEDICINE | Facility: CLINIC | Age: 42
End: 2022-01-25
Payer: COMMERCIAL

## 2022-01-25 VITALS
TEMPERATURE: 97.3 F | HEIGHT: 74 IN | OXYGEN SATURATION: 97 % | DIASTOLIC BLOOD PRESSURE: 80 MMHG | HEART RATE: 68 BPM | RESPIRATION RATE: 16 BRPM | SYSTOLIC BLOOD PRESSURE: 130 MMHG | BODY MASS INDEX: 35.16 KG/M2 | WEIGHT: 274 LBS

## 2022-01-25 DIAGNOSIS — J02.9 SORE THROAT: Primary | ICD-10-CM

## 2022-01-25 LAB
DEPRECATED S PYO AG THROAT QL EIA: NEGATIVE
FLUAV AG SPEC QL IA: NEGATIVE
FLUBV AG SPEC QL IA: NEGATIVE
GROUP A STREP BY PCR: NOT DETECTED

## 2022-01-25 PROCEDURE — 99213 OFFICE O/P EST LOW 20 MIN: CPT | Performed by: FAMILY MEDICINE

## 2022-01-25 PROCEDURE — 87651 STREP A DNA AMP PROBE: CPT | Performed by: FAMILY MEDICINE

## 2022-01-25 PROCEDURE — 87804 INFLUENZA ASSAY W/OPTIC: CPT | Performed by: FAMILY MEDICINE

## 2022-01-25 ASSESSMENT — MIFFLIN-ST. JEOR: SCORE: 2212.61

## 2022-01-25 NOTE — TELEPHONE ENCOUNTER
Pt calling to get a strep test.  Has sore throat, states he tested negative for COVID this am.  Advised he would need an appt, states he has no access to [employer] for an e[facility]. Unable to do virtual since wife is working from home and is using all resources.    Scheduled for this afternoon in person.    Cande Lema RN  [facility]

## 2022-01-25 NOTE — PATIENT INSTRUCTIONS
Over the counter Flonase or nasacort 2 sprays in each nostril once daily   If symptoms are not improving you can add over the counter sudafed once daily in the morning  Continue tylenol and ibuprofen every 8 hours as needed for pain  Follow if symptoms worsen or fail to improve.

## 2022-01-25 NOTE — PROGRESS NOTES
Assessment & Plan     1. Sore throat  - negative rapid COVID, COVID PCR pending  - negative influenza and strep  - symptoms not c/w mono  - likely due to PND  - advised below  Over the counter Flonase or nasacort 2 sprays in each nostril once daily   If symptoms are not improving you can add over the counter sudafed once daily in the morning  Continue tylenol and ibuprofen every 8 hours as needed for pain  Follow if symptoms worsen or fail to improve.  - Influenza A & B Antigen - Clinic Collect  - Streptococcus A Rapid Screen w/Reflex to PCR - Clinic Collect  - Group A Streptococcus PCR Throat Swab          Return in about 1 week (around 2/1/2022) for sympotms are not improving.    Debra Moscoso MD  [facility] HERN[facility]N Dallas is a 41 year old who presents for the following health issues     HPI     Today he had a rapid COVID negative, PCR pending.  Couple of day ago he started experiencing sore throat and headache. Symptoms have gotten worse. Tonsils are swollen and difficult to swallow. He endorse that throat feels scratchy.  No fever, chills, rhinorrhea, PND, cough or shortness of breath.   He has been taking ibuprofen and tylenol.     Review of Systems         Objective    There were no vitals taken for this visit.  There is no height or weight on file to calculate BMI.  Physical Exam   GENERAL: healthy, alert and no distress  HENT: ear canals and TM's normal, nose and mouth without ulcers or lesions  NECK: no adenopathy      Results for orders placed or performed in visit on 01/25/22 (from the past 24 hour(s))   Influenza A & B Antigen - Clinic Collect    Specimen: Nose; Swab   Result Value Ref Range    Influenza A antigen Negative Negative    Influenza B antigen Negative Negative    Narrative    Test results must be correlated with clinical data. If necessary, results should be confirmed by a molecular assay or viral culture.   Streptococcus A Rapid Screen w/Reflex to  PCR - Clinic Collect    Specimen: Throat; Swab   Result Value Ref Range    Group A Strep antigen Negative Negative

## 2022-11-20 ENCOUNTER — HEALTH MAINTENANCE LETTER (OUTPATIENT)
Age: 42
End: 2022-11-20

## 2022-12-05 ENCOUNTER — OFFICE VISIT (OUTPATIENT)
Dept: FAMILY MEDICINE | Facility: CLINIC | Age: 42
End: 2022-12-05
Payer: COMMERCIAL

## 2022-12-05 VITALS
BODY MASS INDEX: 34.52 KG/M2 | TEMPERATURE: 97 F | HEIGHT: 74 IN | SYSTOLIC BLOOD PRESSURE: 131 MMHG | WEIGHT: 269 LBS | OXYGEN SATURATION: 99 % | HEART RATE: 83 BPM | DIASTOLIC BLOOD PRESSURE: 86 MMHG

## 2022-12-05 DIAGNOSIS — Z13.1 SCREENING FOR DIABETES MELLITUS: ICD-10-CM

## 2022-12-05 DIAGNOSIS — Z13.220 SCREENING FOR HYPERLIPIDEMIA: ICD-10-CM

## 2022-12-05 DIAGNOSIS — Z23 NEED FOR PROPHYLACTIC VACCINATION AND INOCULATION AGAINST INFLUENZA: ICD-10-CM

## 2022-12-05 DIAGNOSIS — R03.0 ELEVATED BLOOD PRESSURE READING WITHOUT DIAGNOSIS OF HYPERTENSION: Primary | ICD-10-CM

## 2022-12-05 DIAGNOSIS — Z12.5 SCREENING FOR PROSTATE CANCER: ICD-10-CM

## 2022-12-05 LAB
ALBUMIN SERPL-MCNC: 4 G/DL (ref 3.4–5)
ALP SERPL-CCNC: 65 U/L (ref 40–150)
ALT SERPL W P-5'-P-CCNC: 48 U/L (ref 0–70)
ANION GAP SERPL CALCULATED.3IONS-SCNC: 4 MMOL/L (ref 3–14)
AST SERPL W P-5'-P-CCNC: 25 U/L (ref 0–45)
BILIRUB SERPL-MCNC: 0.5 MG/DL (ref 0.2–1.3)
BUN SERPL-MCNC: 12 MG/DL (ref 7–30)
CALCIUM SERPL-MCNC: 9.1 MG/DL (ref 8.5–10.1)
CHLORIDE BLD-SCNC: 105 MMOL/L (ref 94–109)
CHOLEST SERPL-MCNC: 189 MG/DL
CO2 SERPL-SCNC: 29 MMOL/L (ref 20–32)
CREAT SERPL-MCNC: 0.85 MG/DL (ref 0.66–1.25)
FASTING STATUS PATIENT QL REPORTED: YES
GFR SERPL CREATININE-BSD FRML MDRD: >90 ML/MIN/1.73M2
GLUCOSE BLD-MCNC: 105 MG/DL (ref 70–99)
HBA1C MFR BLD: 5.5 % (ref 0–5.6)
HDLC SERPL-MCNC: 37 MG/DL
LDLC SERPL CALC-MCNC: 108 MG/DL
NONHDLC SERPL-MCNC: 152 MG/DL
POTASSIUM BLD-SCNC: 4.7 MMOL/L (ref 3.4–5.3)
PROT SERPL-MCNC: 7.7 G/DL (ref 6.8–8.8)
PSA SERPL-MCNC: 0.33 UG/L (ref 0–4)
SODIUM SERPL-SCNC: 138 MMOL/L (ref 133–144)
TRIGL SERPL-MCNC: 220 MG/DL

## 2022-12-05 PROCEDURE — G0103 PSA SCREENING: HCPCS | Performed by: FAMILY MEDICINE

## 2022-12-05 PROCEDURE — 80053 COMPREHEN METABOLIC PANEL: CPT | Performed by: FAMILY MEDICINE

## 2022-12-05 PROCEDURE — 83036 HEMOGLOBIN GLYCOSYLATED A1C: CPT | Performed by: FAMILY MEDICINE

## 2022-12-05 PROCEDURE — 80061 LIPID PANEL: CPT | Performed by: FAMILY MEDICINE

## 2022-12-05 PROCEDURE — 90686 IIV4 VACC NO PRSV 0.5 ML IM: CPT | Performed by: FAMILY MEDICINE

## 2022-12-05 PROCEDURE — 90471 IMMUNIZATION ADMIN: CPT | Performed by: FAMILY MEDICINE

## 2022-12-05 PROCEDURE — 99213 OFFICE O/P EST LOW 20 MIN: CPT | Mod: 25 | Performed by: FAMILY MEDICINE

## 2022-12-05 PROCEDURE — 36415 COLL VENOUS BLD VENIPUNCTURE: CPT | Performed by: FAMILY MEDICINE

## 2022-12-05 ASSESSMENT — PAIN SCALES - GENERAL: PAINLEVEL: NO PAIN (0)

## 2022-12-05 NOTE — PATIENT INSTRUCTIONS
Increase  exercise    Reduce salt intake    Check blood pressure occasionally; if average if over 140 on top or over  90 on bottom, let us know and we could start a low dose blood pressure med      We will send you lab results

## 2022-12-05 NOTE — PROGRESS NOTES
"Desiree Dallas is a 42 year old , presenting for the following health issues:  Hypertension and Imm/Inj (Flu Shot)      History of Present Illness       Reason for visit:  High blood pressure    He eats 0-1 servings of fruits and vegetables daily.He consumes 2 sweetened beverage(s) daily.He exercises with enough effort to increase his heart rate 9 or less minutes per day.  He exercises with enough effort to increase his heart rate 3 or less days per week.   He is taking medications regularly.        Review of Systems   High blood pressure at [employer], was renewed for one year instead of two[    Had borderline in past     Never on meds    Drive for a living    Not much exercise    Using cpap nightly          Objective    /86 (BP Location: Left arm, Patient Position: Chair, Cuff Size: Adult Large)   Pulse 83   Temp 97  F (36.1  C) (Temporal)   Ht 1.88 m (6' 2\")   Wt 122 kg (269 lb)   SpO2 99%   BMI 34.54 kg/m    Body mass index is 34.54 kg/m .  Physical Exam  Constitutional:       Appearance: He is well-developed and well-nourished.   HENT:      Head: Normocephalic and atraumatic.   Eyes:      Conjunctiva/sclera: Conjunctivae normal.   Neck:      Vascular: No carotid bruit.   Cardiovascular:      Rate and Rhythm: Normal rate and regular rhythm.      Pulses: Intact distal pulses.      Heart sounds: Normal heart sounds.   Pulmonary:      Effort: Pulmonary effort is normal. No respiratory distress.      Breath sounds: Normal breath sounds.   Musculoskeletal:         General: No edema.   Neurological:      Mental Status: He is alert and oriented to person, place, and time.      Cranial Nerves: No cranial nerve deficit.   Psychiatric:         Mood and Affect: Mood and affect normal.         Speech: Speech normal.         Behavior: Behavior normal.      no edema     Radials symmetric         ASSESSMENT / PLAN:  (R03.0) Elevated blood pressure reading without diagnosis of hypertension  (primary encounter " diagnosis)  Comment: discussed in detail with patient.  Blood pressure readings here are okay ( I rechecked it and very similar to initial reading, in fact a bit lower).    Plan: patient will check on regular basis.  If avg is over 140 on top or 90 on bottom, then let us know.  Keep working on healthy diet/exercise.  Low sodium diet.     (Z13.220) Screening for hyperlipidemia  Comment: check for other risk factors.  Only had coffee with cream this am.   Plan: Lipid panel reflex to direct LDL Non-fasting             (Z23) Need for prophylactic vaccination and inoculation against influenza  Comment: needs   Plan: INFLUENZA VACCINE IM > 6 MONTHS VALENT IIV4         (AFLURIA/FLUZONE)        Given     (Z13.1) Screening for diabetes mellitus  Comment: check   Plan: Comprehensive metabolic panel, Hemoglobin A1c             (Z12.5) Screening for prostate cancer  Comment: psa   Plan: Prostate Specific Antigen Screen               I reviewed the patient's medications, allergies, medical history, family history, and social history.    John Cruz MD

## 2022-12-06 NOTE — RESULT ENCOUNTER NOTE
"Triglycerides and LDL \"bad\" cholesterol moderately elevated.  No medication needed.  Keep working on healthy diet/exercise.    Other labs here are fine.    Normal hemoglobin a1c means no diabetes or prediabetes.    John Cruz MD  "

## 2023-02-06 ENCOUNTER — VIRTUAL VISIT (OUTPATIENT)
Dept: FAMILY MEDICINE | Facility: CLINIC | Age: 43
End: 2023-02-06
Payer: COMMERCIAL

## 2023-02-06 DIAGNOSIS — U07.1 INFECTION DUE TO 2019 NOVEL CORONAVIRUS: Primary | ICD-10-CM

## 2023-02-06 DIAGNOSIS — R06.02 SOB (SHORTNESS OF BREATH): ICD-10-CM

## 2023-02-06 DIAGNOSIS — R05.1 ACUTE COUGH: ICD-10-CM

## 2023-02-06 PROCEDURE — 99213 OFFICE O/P EST LOW 20 MIN: CPT | Mod: CS | Performed by: NURSE PRACTITIONER

## 2023-02-06 NOTE — PROGRESS NOTES
"Burt is a 42 year old who is being evaluated via a billable telephone visit.      What phone number would you like to be contacted at? [phone]   How would you like to obtain your AVS? Jamal    Distant Location (provider location):  On-site    Assessment & Plan     Infection due to 2019 novel coronavirus  Would like treatment. Advised about side effects. No drug-drug interactions.   - nirmatrelvir and ritonavir (PAXLOVID) therapy pack; Take 3 tablets by mouth 2 times daily for 5 days (Take 2 Nirmatrelvir tablets and 1 Ritonavir tablet twice daily for 5 days)    SOB (shortness of breath)/Acute cough  Denies the need for inhaler or cough medication at this time. Should call if he feels he needs it thogh       BMI:   Estimated body mass index is 34.54 kg/m  as calculated from the following:    Height as of 12/5/22: 1.88 m (6' 2\").    Weight as of 12/5/22: 122 kg (269 lb).     Return in about 1 week (around 2/13/2023), or if symptoms worsen or fail to improve.    ANGELO Portillo, NP-C  [facility]   Burt is a 42 year old accompanied by his self, presenting for the following health issues:  Covid Concern      History of Present Illness       Reason for visit:  COVID    He eats 0-1 servings of fruits and vegetables daily.He consumes 1 sweetened beverage(s) daily.He exercises with enough effort to increase his heart rate 9 or less minutes per day.  He exercises with enough effort to increase his heart rate 3 or less days per week.   He is taking medications regularly.         COVID-19 Symptom Review  How many days ago did these symptoms start? 2/4/23  TESTED POSITIVE: 2/4/23  Are any of the following symptoms significant for you?  New or worsening difficulty breathing? Yes    Please describe what kind of difficulty you are having breathing:Mild dyspnea (able to do ADLs without difficulty, mild shortness of breath with activities such as climbing one or two flights of stairs or " walking briskly)    Worsening cough? Yes, I am coughing up mucus.    Fever or chills? Yes, I felt feverish or had chills.    Headache: YES    Sore throat: No    Chest pain: No    Diarrhea: No    Body aches? YES    What treatments has patient tried? theraflu  Does patient live in a nursing home, group home, or shelter? No  Does patient have a way to get food/medications during quarantined? Yes, I have a friend or family member who can help me.                  Review of Systems   Constitutional, HEENT, cardiovascular, pulmonary-as above, gi and gu systems are negative, except as otherwise noted.      Objective    Vitals - Patient Reported  Pain Score: Severe Pain (7)      Vitals:  No vitals were obtained today due to virtual visit.    Physical Exam   Sounds tired, alert and no distress  PSYCH: Alert and oriented times 3; coherent speech, normal   rate and volume, able to articulate logical thoughts, able   to abstract reason, no tangential thoughts, no hallucinations   or delusions  His affect is normal  RESP: No cough, no audible wheezing, able to talk in full sentences  Remainder of exam unable to be completed due to telephone visits    Did home COVID test that was positive. Renal function normal fall of 2022            Phone call duration: 7 minutes

## 2023-04-15 ENCOUNTER — HEALTH MAINTENANCE LETTER (OUTPATIENT)
Age: 43
End: 2023-04-15

## 2024-06-16 ENCOUNTER — HEALTH MAINTENANCE LETTER (OUTPATIENT)
Age: 44
End: 2024-06-16

## 2025-01-22 ENCOUNTER — OFFICE VISIT (OUTPATIENT)
Dept: URGENT CARE | Facility: URGENT CARE | Age: 45
End: 2025-01-22
Payer: COMMERCIAL

## 2025-01-22 ENCOUNTER — ANCILLARY PROCEDURE (OUTPATIENT)
Dept: GENERAL RADIOLOGY | Facility: CLINIC | Age: 45
End: 2025-01-22
Attending: NURSE PRACTITIONER
Payer: COMMERCIAL

## 2025-01-22 VITALS
WEIGHT: 266 LBS | RESPIRATION RATE: 14 BRPM | DIASTOLIC BLOOD PRESSURE: 93 MMHG | BODY MASS INDEX: 34.15 KG/M2 | OXYGEN SATURATION: 98 % | TEMPERATURE: 98.2 F | SYSTOLIC BLOOD PRESSURE: 145 MMHG | HEART RATE: 68 BPM

## 2025-01-22 DIAGNOSIS — S22.32XA CLOSED FRACTURE OF ONE RIB OF LEFT SIDE, INITIAL ENCOUNTER: Primary | ICD-10-CM

## 2025-01-22 DIAGNOSIS — S29.9XXA TRAUMATIC INJURY OF RIB: ICD-10-CM

## 2025-01-22 PROCEDURE — 71101 X-RAY EXAM UNILAT RIBS/CHEST: CPT | Mod: TC | Performed by: RADIOLOGY

## 2025-01-22 PROCEDURE — 99214 OFFICE O/P EST MOD 30 MIN: CPT | Performed by: NURSE PRACTITIONER

## 2025-01-22 RX ORDER — ACETAMINOPHEN AND CODEINE PHOSPHATE 300; 30 MG/1; MG/1
1 TABLET ORAL EVERY 6 HOURS PRN
Qty: 6 TABLET | Refills: 0 | Status: SHIPPED | OUTPATIENT
Start: 2025-01-22 | End: 2025-01-28

## 2025-01-22 ASSESSMENT — PAIN SCALES - GENERAL: PAINLEVEL_OUTOF10: SEVERE PAIN (7)

## 2025-01-22 NOTE — PROGRESS NOTES
Chief Complaint   Patient presents with    Pain     INJURED LEFT SIDE OF CHEST WHILE REACHING FOR SOMETHING, INCIDENT HAPPEN 1/17/25, CONSTANT, RATES PAIN 7/10, PAIN IS WORSE WITH COUGH/SNEEZING/BREATH, DIFFICULTY SLEEPING DUE TO PAIN    Medication Update     TAKING IBUPROFEN AND TYLENOL     SUBJECTIVE:  Burt Graham is a 44 year old male presenting with left-sided anterior rib cage pain over the past several days following an injury.  He was bending over his truck and had a pocket knife in his shirt which crushed into his rib cage.  He immediately felt pain which has been lingering.  Worse with movement coughing breathing sneezing tender to touch.  No shortness of breath cough hemoptysis.  No prior relevant past medical history.    [place] PDM checked and last controlled substance was for an animal.  01/12/2025 11/14/2024 1 Phenobarbital 16.2 Mg Tablet 120.00 60 Ke Wel     Past Medical History:   Diagnosis Date    Depressive disorder     NO ACTIVE PROBLEMS     GWENDOLYN (obstructive sleep apnea) Severe 2/16/2017    Home Sleep Apnea Test 2/16/2017 at [facility] 35 wt 241    TMJ (dislocation of temporomandibular joint)      Current Outpatient Medications   Medication Sig Dispense Refill    acetaminophen-codeine (TYLENOL #3) 300-30 MG per tablet Take 1 tablet by mouth every 6 hours as needed for severe pain. 6 tablet 0    venlafaxine (EFFEXOR-ER) 75 MG TB24 Take 75 mg by mouth daily (with breakfast)       No current facility-administered medications for this visit.     Social History     Tobacco Use    Smoking status: Never    Smokeless tobacco: Current     Types: Chew    Tobacco comments:     PT STATES HE CHEWS TOBACCO OCCASIONALLY   Substance Use Topics    Alcohol use: Yes     Alcohol/week: 0.0 standard drinks of alcohol     Comment: occasional     Allergies   Allergen Reactions    No Known Drug Allergy        Review of Systems  All systems negative except for those listed above in HPI.    OBJECTIVE:   BP (!) 145/93    Pulse 68   Temp 98.2  F (36.8  C) (Oral)   Resp 14   Wt 120.7 kg (266 lb)   SpO2 98%   BMI 34.15 kg/m    Physical Exam  Vitals reviewed.   Constitutional:       Appearance: Normal appearance.   HENT:      Head: Normocephalic and atraumatic.      Nose: Nose normal.      Mouth/Throat:      Mouth: Mucous membranes are moist.      Pharynx: Oropharynx is clear.   Eyes:      Extraocular Movements: Extraocular movements intact.      Conjunctiva/sclera: Conjunctivae normal.      Pupils: Pupils are equal, round, and reactive to light.   Cardiovascular:      Rate and Rhythm: Normal rate.      Pulses: Normal pulses.   Pulmonary:      Effort: Pulmonary effort is normal. No respiratory distress.      Breath sounds: Normal breath sounds. No stridor. No wheezing, rhonchi or rales.   Chest:      Chest wall: Tenderness present.   Musculoskeletal:         General: Tenderness and signs of injury present. No swelling or deformity. Normal range of motion.      Cervical back: Normal range of motion and neck supple.   Skin:     General: Skin is warm and dry.      Coloration: Skin is not pale.      Findings: No bruising, erythema or rash.   Neurological:      General: No focal deficit present.      Mental Status: He is alert and oriented to person, place, and time.   Psychiatric:         Mood and Affect: Mood normal.         Behavior: Behavior normal.       X-ray done in clinic read by me as questionable 11th rib fracture.  No pneumothorax or pneumonia.  Results for orders placed or performed in visit on 01/22/25   XR Ribs & Chest Left G/E 3 Views     Status: None    Narrative    EXAM: XR RIBS AND CHEST LEFT 3 VIEWS  LOCATION: [facility]  DATE: 1/22/2025    INDICATION: left sided anterior ribcage pain following crushing injury 01 17, tender, worse with movements  COMPARISON: None.      Impression    IMPRESSION: The cardiac silhouette is normal in size and configuration without pulmonary vascular congestion.  No pleural effusion, pneumothorax, or consolidation.  No rib fractures.     ASSESSMENT:    ICD-10-CM    1. Closed fracture of one rib of left side, initial encounter  S22.32XA acetaminophen-codeine (TYLENOL #3) 300-30 MG per tablet      2. Traumatic injury of rib  S29.9XXA XR Ribs & Chest Left G/E 3 Views     acetaminophen-codeine (TYLENOL #3) 300-30 MG per tablet          PLAN:     Discussed with patient 11th left rib fracture seen on one view  Radiology final read is negative and normal  Would not change treatment plan, rib fractures can be subtle and not always show up right away on imaging  Chest wall trauma with costochondritis is also likely  Rotate Tylenol ibuprofen every 4-6 hours as needed  Tylenol 3 per request -use with caution due to sedation constipation  Topical Voltaren or Lidoderm patches  Rest ice heat stretch cough deep breathe to open up the lungs and prevent complications  PCP if lingering or concerns  ER if severe worsening symptoms chest pain shortness of breath fevers bloody sputum    Follow up with primary care provider with any problems, questions or concerns or if symptoms worsen or fail to improve. Patient agreed to plan and verbalized understanding.    Velma Garcia, [facility] [place]

## 2025-01-23 NOTE — PATIENT INSTRUCTIONS
Discussed with patient 11th left rib fracture seen on one view  Radiology final read is negative and normal  Would not change treatment plan, rib fractures can be subtle and not always show up right away on imaging  Chest wall trauma with costochondritis is also likely  Rotate Tylenol ibuprofen every 4-6 hours as needed  Tylenol 3 per request for breakthrough severe pain or at night - use with caution due to sedation constipation  Topical Voltaren or Lidoderm patches  Rest ice heat stretch cough deep breathe to open up the lungs and prevent complications  PCP if lingering or concerns  ER if severe worsening symptoms chest pain shortness of breath fevers bloody sputum

## 2025-01-30 ENCOUNTER — OFFICE VISIT (OUTPATIENT)
Dept: URGENT CARE | Facility: URGENT CARE | Age: 45
End: 2025-01-30
Payer: COMMERCIAL

## 2025-01-30 VITALS
WEIGHT: 254.6 LBS | HEART RATE: 70 BPM | OXYGEN SATURATION: 97 % | TEMPERATURE: 99.4 F | RESPIRATION RATE: 18 BRPM | SYSTOLIC BLOOD PRESSURE: 138 MMHG | BODY MASS INDEX: 32.69 KG/M2 | DIASTOLIC BLOOD PRESSURE: 84 MMHG

## 2025-01-30 DIAGNOSIS — J10.1 INFLUENZA A: Primary | ICD-10-CM

## 2025-01-30 DIAGNOSIS — R05.1 ACUTE COUGH: ICD-10-CM

## 2025-01-30 DIAGNOSIS — R50.9 FEVER, UNSPECIFIED FEVER CAUSE: ICD-10-CM

## 2025-01-30 LAB
FLUAV AG SPEC QL IA: POSITIVE
FLUBV AG SPEC QL IA: NEGATIVE

## 2025-01-30 RX ORDER — BENZONATATE 200 MG/1
200 CAPSULE ORAL 3 TIMES DAILY PRN
Qty: 21 CAPSULE | Refills: 0 | Status: SHIPPED | OUTPATIENT
Start: 2025-01-30

## 2025-01-30 RX ORDER — OSELTAMIVIR PHOSPHATE 75 MG/1
75 CAPSULE ORAL 2 TIMES DAILY
Qty: 10 CAPSULE | Refills: 0 | Status: SHIPPED | OUTPATIENT
Start: 2025-01-30 | End: 2025-02-04

## 2025-01-30 ASSESSMENT — PAIN SCALES - GENERAL: PAINLEVEL_OUTOF10: MODERATE PAIN (5)

## 2025-01-30 NOTE — PATIENT INSTRUCTIONS
Influenza test positive for influenza A.  Take Tamiflu as prescribed.  Stay home activities/work for the next 24 hours and until fever free.  Get plenty of rest and drink fluids.  Can use Tylenol as needed for pain and fever.  Maximum dose of Tylenol is 4000mg in a 24 hour period of time.  Follow a bland diet and advance as tolerated.   [place] diet can consist of any of the following: Broiled/boiled starches such as potatoes, noodles or rice and/or cooked soft cereals such as wheat or oats with some salt but not any spice.  Crackers, bananas, toast, yogurt, soups, and boiled vegetables can also be included.   Avoid spicy, greasy, fatty and sugary foods.  Smaller, more frequent meals are better than trying to eat a lot at once.  Drink a minimum of 90 ounces of water per day.  Some of this total volume can be Pedialyte.  Avoid Gatorade and Powerade as these are high in sugar content.  Take Pepto Bismol for your symptoms.

## 2025-01-30 NOTE — PROGRESS NOTES
ASSESSMENT:  (J10.1) Influenza A  (primary encounter diagnosis)  Plan: oseltamivir (TAMIFLU) 75 MG capsule    (R50.9) Fever, unspecified fever cause  Plan: Influenza A & B Antigen - Clinic Collect    (R05.1) Acute cough  Plan: benzonatate (TESSALON) 200 MG capsule    PLAN:  Informed the patient that the influenza test is positive for influenza A.  We discussed taking Tamiflu as prescribed and staying home from activities/work for the next 24 hours until fever free.  We also discussed the need to get plenty of rest, drink fluids, follow a bland diet and use Tylenol as needed for pain and fever with the maximum dose being 4000 mg in a 24-hour period of time.  Informed the patient to take Pepto-Bismol for his symptoms.  Discussed the need to return to clinic with any new or worsening symptoms.  Patient acknowledged his understanding of the above plan.    The use of Dragon/Volanceation services may have been used to construct the content in this note; any grammatical or spelling errors are non-intentional. Please contact the author of this note directly if you are in need of any clarification.      Sixto Murphy, ANGELO CNP      SUBJECTIVE:   Burt Graham is a 44 year old male presenting with a chief complaint of fever, chills, sweats, runny nose, stuffy nose, headache, body aches, fatigue, vomiting, and diarrhea.  Onset of symptoms was 2 day(s) ago.  Course of illness is worsening.    Patient denies: ear pain and sore throat  Treatment measures tried include Tylenol/Ibuprofen and OTC Cough med.  Predisposing factors include ill contact: Family member .    ROS:  Negative except noted above.    OBJECTIVE:  /84 (BP Location: Left arm, Patient Position: Sitting, Cuff Size: Adult Regular)   Pulse 70   Temp 99.4  F (37.4  C) (Oral)   Resp 18   Wt 115.5 kg (254 lb 9.6 oz)   SpO2 97%   BMI 32.69 kg/m    GENERAL APPEARANCE: healthy, alert and no distress  EYES: EOMI,  PERRL, conjunctiva clear  HENT: ear  canals and TM's normal.  Nose and mouth without ulcers, erythema or lesions  NECK: supple, nontender, no lymphadenopathy  RESP: lungs clear to auscultation - no rales, rhonchi or wheezes  CV: regular rates and rhythm, normal S1 S2, no murmur noted  ABDOMEN:  soft, nontender, no HSM or masses and bowel sounds normal  SKIN: no suspicious lesions or rashes

## 2025-02-01 ENCOUNTER — APPOINTMENT (OUTPATIENT)
Dept: GENERAL RADIOLOGY | Facility: CLINIC | Age: 45
End: 2025-02-01
Attending: FAMILY MEDICINE
Payer: COMMERCIAL

## 2025-02-01 ENCOUNTER — NURSE TRIAGE (OUTPATIENT)
Dept: NURSING | Facility: CLINIC | Age: 45
End: 2025-02-01
Payer: COMMERCIAL

## 2025-02-01 ENCOUNTER — HOSPITAL ENCOUNTER (EMERGENCY)
Facility: CLINIC | Age: 45
Discharge: HOME | End: 2025-02-01
Attending: FAMILY MEDICINE | Admitting: FAMILY MEDICINE
Payer: COMMERCIAL

## 2025-02-01 VITALS
HEART RATE: 80 BPM | OXYGEN SATURATION: 98 % | DIASTOLIC BLOOD PRESSURE: 89 MMHG | BODY MASS INDEX: 31.74 KG/M2 | RESPIRATION RATE: 18 BRPM | TEMPERATURE: 97.6 F | HEIGHT: 74 IN | WEIGHT: 247.3 LBS | SYSTOLIC BLOOD PRESSURE: 141 MMHG

## 2025-02-01 DIAGNOSIS — R07.81 RIB PAIN: ICD-10-CM

## 2025-02-01 DIAGNOSIS — J10.1 INFLUENZA A: ICD-10-CM

## 2025-02-01 DIAGNOSIS — E86.0 DEHYDRATION: ICD-10-CM

## 2025-02-01 LAB
ALBUMIN SERPL BCG-MCNC: 4.4 G/DL (ref 3.5–5.2)
ALP SERPL-CCNC: 60 U/L (ref 40–150)
ALT SERPL W P-5'-P-CCNC: 77 U/L (ref 0–70)
ANION GAP SERPL CALCULATED.3IONS-SCNC: 13 MMOL/L (ref 7–15)
AST SERPL W P-5'-P-CCNC: 73 U/L (ref 0–45)
BASOPHILS # BLD AUTO: 0 10E3/UL (ref 0–0.2)
BASOPHILS NFR BLD AUTO: 1 %
BILIRUB SERPL-MCNC: 0.6 MG/DL
BUN SERPL-MCNC: 9.6 MG/DL (ref 6–20)
CALCIUM SERPL-MCNC: 9.3 MG/DL (ref 8.8–10.4)
CHLORIDE SERPL-SCNC: 98 MMOL/L (ref 98–107)
CREAT SERPL-MCNC: 0.85 MG/DL (ref 0.67–1.17)
EGFRCR SERPLBLD CKD-EPI 2021: >90 ML/MIN/1.73M2
EOSINOPHIL # BLD AUTO: 0 10E3/UL (ref 0–0.7)
EOSINOPHIL NFR BLD AUTO: 0 %
ERYTHROCYTE [DISTWIDTH] IN BLOOD BY AUTOMATED COUNT: 12.2 % (ref 10–15)
GLUCOSE SERPL-MCNC: 91 MG/DL (ref 70–99)
HCO3 SERPL-SCNC: 26 MMOL/L (ref 22–29)
HCT VFR BLD AUTO: 47.5 % (ref 40–53)
HGB BLD-MCNC: 16.3 G/DL (ref 13.3–17.7)
IMM GRANULOCYTES # BLD: 0 10E3/UL
IMM GRANULOCYTES NFR BLD: 0 %
LYMPHOCYTES # BLD AUTO: 1.2 10E3/UL (ref 0.8–5.3)
LYMPHOCYTES NFR BLD AUTO: 28 %
MCH RBC QN AUTO: 30.8 PG (ref 26.5–33)
MCHC RBC AUTO-ENTMCNC: 34.3 G/DL (ref 31.5–36.5)
MCV RBC AUTO: 90 FL (ref 78–100)
MONOCYTES # BLD AUTO: 0.6 10E3/UL (ref 0–1.3)
MONOCYTES NFR BLD AUTO: 14 %
NEUTROPHILS # BLD AUTO: 2.4 10E3/UL (ref 1.6–8.3)
NEUTROPHILS NFR BLD AUTO: 57 %
NRBC # BLD AUTO: 0 10E3/UL
NRBC BLD AUTO-RTO: 0 /100
PLATELET # BLD AUTO: 169 10E3/UL (ref 150–450)
POTASSIUM SERPL-SCNC: 3.9 MMOL/L (ref 3.4–5.3)
PROCALCITONIN SERPL IA-MCNC: 0.1 NG/ML
PROT SERPL-MCNC: 7.6 G/DL (ref 6.4–8.3)
RBC # BLD AUTO: 5.3 10E6/UL (ref 4.4–5.9)
SODIUM SERPL-SCNC: 137 MMOL/L (ref 135–145)
WBC # BLD AUTO: 4.1 10E3/UL (ref 4–11)

## 2025-02-01 PROCEDURE — 85004 AUTOMATED DIFF WBC COUNT: CPT | Performed by: FAMILY MEDICINE

## 2025-02-01 PROCEDURE — 96361 HYDRATE IV INFUSION ADD-ON: CPT | Performed by: FAMILY MEDICINE

## 2025-02-01 PROCEDURE — 82947 ASSAY GLUCOSE BLOOD QUANT: CPT | Performed by: FAMILY MEDICINE

## 2025-02-01 PROCEDURE — 258N000003 HC RX IP 258 OP 636: Performed by: FAMILY MEDICINE

## 2025-02-01 PROCEDURE — 250N000011 HC RX IP 250 OP 636: Performed by: FAMILY MEDICINE

## 2025-02-01 PROCEDURE — 71046 X-RAY EXAM CHEST 2 VIEWS: CPT

## 2025-02-01 PROCEDURE — 99284 EMERGENCY DEPT VISIT MOD MDM: CPT | Mod: 25 | Performed by: FAMILY MEDICINE

## 2025-02-01 PROCEDURE — 84145 PROCALCITONIN (PCT): CPT | Performed by: FAMILY MEDICINE

## 2025-02-01 PROCEDURE — 96374 THER/PROPH/DIAG INJ IV PUSH: CPT | Performed by: FAMILY MEDICINE

## 2025-02-01 PROCEDURE — 36415 COLL VENOUS BLD VENIPUNCTURE: CPT | Performed by: FAMILY MEDICINE

## 2025-02-01 PROCEDURE — 85041 AUTOMATED RBC COUNT: CPT | Performed by: FAMILY MEDICINE

## 2025-02-01 PROCEDURE — 99284 EMERGENCY DEPT VISIT MOD MDM: CPT | Performed by: FAMILY MEDICINE

## 2025-02-01 PROCEDURE — 84075 ASSAY ALKALINE PHOSPHATASE: CPT | Performed by: FAMILY MEDICINE

## 2025-02-01 PROCEDURE — 82247 BILIRUBIN TOTAL: CPT | Performed by: FAMILY MEDICINE

## 2025-02-01 RX ORDER — KETOROLAC TROMETHAMINE 10 MG/1
10 TABLET, FILM COATED ORAL EVERY 6 HOURS PRN
Qty: 20 TABLET | Refills: 0 | Status: SHIPPED | OUTPATIENT
Start: 2025-02-01

## 2025-02-01 RX ORDER — KETOROLAC TROMETHAMINE 30 MG/ML
30 INJECTION, SOLUTION INTRAMUSCULAR; INTRAVENOUS ONCE
Status: COMPLETED | OUTPATIENT
Start: 2025-02-01 | End: 2025-02-01

## 2025-02-01 RX ORDER — ACETAMINOPHEN 500 MG
1000 TABLET ORAL EVERY 6 HOURS PRN
COMMUNITY

## 2025-02-01 RX ADMIN — KETOROLAC TROMETHAMINE 30 MG: 30 INJECTION, SOLUTION INTRAMUSCULAR at 09:22

## 2025-02-01 RX ADMIN — SODIUM CHLORIDE, POTASSIUM CHLORIDE, SODIUM LACTATE AND CALCIUM CHLORIDE 1000 ML: 600; 310; 30; 20 INJECTION, SOLUTION INTRAVENOUS at 11:51

## 2025-02-01 RX ADMIN — SODIUM CHLORIDE 1000 ML: 9 INJECTION, SOLUTION INTRAVENOUS at 09:22

## 2025-02-01 ASSESSMENT — ACTIVITIES OF DAILY LIVING (ADL)
ADLS_ACUITY_SCORE: 41

## 2025-02-01 ASSESSMENT — COLUMBIA-SUICIDE SEVERITY RATING SCALE - C-SSRS
6. HAVE YOU EVER DONE ANYTHING, STARTED TO DO ANYTHING, OR PREPARED TO DO ANYTHING TO END YOUR LIFE?: NO
2. HAVE YOU ACTUALLY HAD ANY THOUGHTS OF KILLING YOURSELF IN THE PAST MONTH?: NO
1. IN THE PAST MONTH, HAVE YOU WISHED YOU WERE DEAD OR WISHED YOU COULD GO TO SLEEP AND NOT WAKE UP?: NO

## 2025-02-01 NOTE — TELEPHONE ENCOUNTER
Nurse Triage SBAR    Is this a 2nd Level Triage? NO    Situation: Pt and wife calling. Verbal consent to communicate given to speak to wife. Pt is showing signs of dehydration with diarrhea     Background: [place] [facility] [place] on 1/30/2025    Assessment: Pt is not able to stay hydrated. He reports feeling dizzy, dry mouth, and has not urinated in the last 12 hours. He is having watery diarrhea every time he drinks fluids. Pt is currently on the toilet. He is getting weaker. Taking pepto bismol; stools are black, but patient is aware of the side effect of this medication. Pt is also having severe rib pain from his fractured rib when he coughs; was told to take Tylenol per wife's report     Protocol Recommended Disposition:   Go to ED Now (Or PCP Triage)    Recommendation: ED. No PCP. Sip on water every 5 minutes. Pt and wife agree with this plan.     Reason for Disposition   [1] Drinking very little AND [2] dehydration suspected (e.g., no urine > 12 hours, very dry mouth, very lightheaded)    Additional Information   Negative: Shock suspected (e.g., cold/pale/clammy skin, too weak to stand, low BP, rapid pulse)   Negative: Difficult to awaken or acting confused (e.g., disoriented, slurred speech)   Negative: Sounds like a life-threatening emergency to the triager   Negative: Vomiting also present and worse than the diarrhea   Negative: [1] Blood in stool AND [2] without diarrhea   Negative: [1] Blood in the stool AND [2] moderate or large amount of blood   Negative: Black or tarry bowel movements  (Exception: Chronic-unchanged black-grey BMs AND is taking iron pills or Pepto-Bismol.)   Negative: [1] SEVERE abdominal pain (e.g., excruciating) AND [2] present > 1 hour   Negative: [1] SEVERE abdominal pain AND [2] age > 60 years    Protocols used: Diarrhea-LAWRENCE Maxwell RN   Triage Nurse Advisor on 2/1/2025 at 7:54 AM

## 2025-02-01 NOTE — ED PROVIDER NOTES
ED Provider Note  [facility]      History     Chief Complaint   Patient presents with    Dehydration     Pt already dx with influenza A.  Here because he started to get diarrhea multiple days and has increased over past 36 hours.  Here for hydration.  Feels weak.  Pt has a cracked rib that is prior to tg his illness.  Hurts every time he coughs.  Told to take tylenol for it but it is not helping.  Needs something stronger.  Here with wife.     HPI  Burt Graham is a 44 year old male who presents to the emergency department with diarrhea. Patient had urgent care visit on 1/30/2025 for evaluation of 2 days of fever, chills, runny nose, headache, body aches, fatigue, vomiting, and diarrhea and tested positive for influenza A. He was prescribed tamiflu.    Here in the ED, patient reports that he has had increased diarrhea over the past 36 hours with decreased p.o. intake.  He states he feels weak and is concerned he is dehydrated. He also notes that he has a cracked rib that has been present prior to illness which hurts when he coughs.  He has been taking Tylenol, but to no relief.      Past Medical History  Past Medical History:   Diagnosis Date    Depressive disorder     NO ACTIVE PROBLEMS     GWENDOLYN (obstructive sleep apnea) Severe 2/16/2017    Home Sleep Apnea Test 2/16/2017 at [facility] 35 wt 241    TMJ (dislocation of temporomandibular joint)      History reviewed. No pertinent surgical history.  acetaminophen (TYLENOL) 500 MG tablet  benzonatate (TESSALON) 200 MG capsule  bismuth subsalicylate (PEPTO BISMOL) 262 MG/15ML suspension  ketorolac (TORADOL) 10 MG tablet  oseltamivir (TAMIFLU) 75 MG capsule  venlafaxine (EFFEXOR-ER) 75 MG TB24      Allergies   Allergen Reactions    No Known Drug Allergy      Family History  Family History   Problem Relation Age of Onset    Other - See Comments Father         accident     Social History   Social History     Tobacco Use    Smoking status: Never     "Smokeless tobacco: Current     Types: Chew    Tobacco comments:     PT STATES HE CHEWS TOBACCO OCCASIONALLY   Vaping Use    Vaping status: Never Used   Substance Use Topics    Alcohol use: Yes     Comment: rare    Drug use: Yes     Types: Marijuana     Comment: regularly but none since got sick  None for 5 days      A medically appropriate review of systems was performed with pertinent positives and negatives noted in the HPI, and all other systems negative.    Physical Exam   BP: 119/78  Pulse: 73  Temp: 97.6  F (36.4  C)  Resp: 18  Height: 188 cm (6' 2\")  Weight: 112.2 kg (247 lb 4.8 oz)  SpO2: 98 %  Physical Exam  Constitutional:       General: He is not in acute distress.     Appearance: Normal appearance. He is not toxic-appearing.   HENT:      Head: Atraumatic.   Eyes:      General: No scleral icterus.     Conjunctiva/sclera: Conjunctivae normal.   Cardiovascular:      Rate and Rhythm: Normal rate.      Heart sounds: Normal heart sounds.   Pulmonary:      Effort: Pulmonary effort is normal. No respiratory distress.      Breath sounds: Normal breath sounds.   Chest:      Chest wall: Tenderness present.   Abdominal:      Palpations: Abdomen is soft.      Tenderness: There is no abdominal tenderness.   Musculoskeletal:         General: No deformity.      Cervical back: Neck supple.   Skin:     General: Skin is warm.   Neurological:      General: No focal deficit present.      Mental Status: He is alert and oriented to person, place, and time.      Sensory: No sensory deficit.      Motor: No weakness.      Coordination: Coordination normal.           ED Course, Procedures, & Data           Results for orders placed or performed during the hospital encounter of 02/01/25   XR Chest 2 Views     Status: None    Narrative    EXAM: XR CHEST 2 VIEWS  LOCATION: [facility]  DATE: 02/01/2025    INDICATION: Cough. Rib pain.  COMPARISON: 01/22/2025.      Impression    IMPRESSION: " Heart and mediastinal size are normal. There is bibasilar bandlike opacity, representing either atelectasis or infectious process. No obvious pleural effusion. There is no pneumothorax. No rib fractures.         Comprehensive metabolic panel     Status: Abnormal   Result Value Ref Range    Sodium 137 135 - 145 mmol/L    Potassium 3.9 3.4 - 5.3 mmol/L    Carbon Dioxide (CO2) 26 22 - 29 mmol/L    Anion Gap 13 7 - 15 mmol/L    Urea Nitrogen 9.6 6.0 - 20.0 mg/dL    Creatinine 0.85 0.67 - 1.17 mg/dL    GFR Estimate >90 >60 mL/min/1.73m2    Calcium 9.3 8.8 - 10.4 mg/dL    Chloride 98 98 - 107 mmol/L    Glucose 91 70 - 99 mg/dL    Alkaline Phosphatase 60 40 - 150 U/L    AST 73 (H) 0 - 45 U/L    ALT 77 (H) 0 - 70 U/L    Protein Total 7.6 6.4 - 8.3 g/dL    Albumin 4.4 3.5 - 5.2 g/dL    Bilirubin Total 0.6 <=1.2 mg/dL   CBC with platelets and differential     Status: None   Result Value Ref Range    WBC Count 4.1 4.0 - 11.0 10e3/uL    RBC Count 5.30 4.40 - 5.90 10e6/uL    Hemoglobin 16.3 13.3 - 17.7 g/dL    Hematocrit 47.5 40.0 - 53.0 %    MCV 90 78 - 100 fL    MCH 30.8 26.5 - 33.0 pg    MCHC 34.3 31.5 - 36.5 g/dL    RDW 12.2 10.0 - 15.0 %    Platelet Count 169 150 - 450 10e3/uL    % Neutrophils 57 %    % Lymphocytes 28 %    % Monocytes 14 %    % Eosinophils 0 %    % Basophils 1 %    % Immature Granulocytes 0 %    NRBCs per 100 WBC 0 <1 /100    Absolute Neutrophils 2.4 1.6 - 8.3 10e3/uL    Absolute Lymphocytes 1.2 0.8 - 5.3 10e3/uL    Absolute Monocytes 0.6 0.0 - 1.3 10e3/uL    Absolute Eosinophils 0.0 0.0 - 0.7 10e3/uL    Absolute Basophils 0.0 0.0 - 0.2 10e3/uL    Absolute Immature Granulocytes 0.0 <=0.4 10e3/uL    Absolute NRBCs 0.0 10e3/uL   Procalcitonin     Status: Normal   Result Value Ref Range    Procalcitonin 0.10 <0.50 ng/mL   CBC with platelets differential     Status: None    Narrative    The following orders were created for panel order CBC with platelets differential.  Procedure                                Abnormality         Status                     ---------                               -----------         ------                     CBC with platelets and d...[[id number]]                      Final result                 Please view results for these tests on the individual orders.     Medications   sodium chloride 0.9% BOLUS 1,000 mL (0 mLs Intravenous Stopped 2/1/25 1048)   ketorolac (TORADOL) injection 30 mg (30 mg Intravenous $Given 2/1/25 8615)   lactated ringers BOLUS 1,000 mL (0 mLs Intravenous Stopped 2/1/25 1302)     Labs Ordered and Resulted from Time of ED Arrival to Time of ED Departure   COMPREHENSIVE METABOLIC PANEL - Abnormal       Result Value    Sodium 137      Potassium 3.9      Carbon Dioxide (CO2) 26      Anion Gap 13      Urea Nitrogen 9.6      Creatinine 0.85      GFR Estimate >90      Calcium 9.3      Chloride 98      Glucose 91      Alkaline Phosphatase 60      AST 73 (*)     ALT 77 (*)     Protein Total 7.6      Albumin 4.4      Bilirubin Total 0.6     PROCALCITONIN - Normal    Procalcitonin 0.10     CBC WITH PLATELETS AND DIFFERENTIAL    WBC Count 4.1      RBC Count 5.30      Hemoglobin 16.3      Hematocrit 47.5      MCV 90      MCH 30.8      MCHC 34.3      RDW 12.2      Platelet Count 169      % Neutrophils 57      % Lymphocytes 28      % Monocytes 14      % Eosinophils 0      % Basophils 1      % Immature Granulocytes 0      NRBCs per 100 WBC 0      Absolute Neutrophils 2.4      Absolute Lymphocytes 1.2      Absolute Monocytes 0.6      Absolute Eosinophils 0.0      Absolute Basophils 0.0      Absolute Immature Granulocytes 0.0      Absolute NRBCs 0.0       XR Chest 2 Views   Final Result   IMPRESSION: Heart and mediastinal size are normal. There is bibasilar bandlike opacity, representing either atelectasis or infectious process. No obvious pleural effusion. There is no pneumothorax. No rib fractures.                      Critical care was not performed.     Medical Decision Making  The  patient's presentation was of moderate complexity (an acute illness with systemic symptoms).    The patient's evaluation involved:  ordering and/or review of 3+ test(s) in this encounter (see separate area of note for details)    The patient's management necessitated moderate risk (prescription drug management including medications given in the ED).    Assessment & Plan        I have reviewed the nursing notes. I have reviewed the findings, diagnosis, plan and need for follow up with the patient.    Discharge Medication List as of 2/1/2025  1:08 PM        START taking these medications    Details   ketorolac (TORADOL) 10 MG tablet Take 1 tablet (10 mg) by mouth every 6 hours as needed for moderate pain., Disp-20 tablet, R-0, E-Prescribe             Final diagnoses:   Influenza A   Rib pain   Dehydration   I, Ashley Castaneda, am serving as a trained medical scribe to document services personally performed by Yoshi Crowley MD, based on the provider's statements to me.     IYoshi MD, was physically present and have reviewed and verified the accuracy of this note documented by Ashley Castaneda.     Yoshi Crowley MD  [facility] ED EMERGENCY DEPARTMENT  2/1/2025     Yoshi Crowley MD  02/01/25 2633

## 2025-02-01 NOTE — DISCHARGE INSTRUCTIONS
Discharge to home rest push fluids utilize spirometer to keep expanding your lungs follow-up with your primary MD if not improving in the next 2 to 3 days

## 2025-02-03 ENCOUNTER — VIRTUAL VISIT (OUTPATIENT)
Dept: FAMILY MEDICINE | Facility: CLINIC | Age: 45
End: 2025-02-03
Payer: COMMERCIAL

## 2025-02-03 DIAGNOSIS — J10.1 INFLUENZA A: Primary | ICD-10-CM

## 2025-02-03 DIAGNOSIS — B34.9 VIRAL SYNDROME: ICD-10-CM

## 2025-02-03 PROCEDURE — 98005 SYNCH AUDIO-VIDEO EST LOW 20: CPT | Performed by: INTERNAL MEDICINE

## 2025-02-03 NOTE — Clinical Note
2/3/2025    Burt Graham   1980        To Whom it May Concern;    Please excuse Burt Graham from work/school for a healthcare visit on Feb 3, 2025.    Sincerely,        Jillian Jo MD

## 2025-02-03 NOTE — LETTER
February 3, 2025      Burt Graham  [address] [id number]        To Whom It May Concern:    Burt Graham was seen in our clinic. He may return to work on Tuesday February 4th, 2025. He was treated for a Flu respiratory illness, and has been 24 hours free of symptoms, and onset of symptoms at least five days.      Sincerely,        Jillian Jo MD    Electronically signed

## 2025-02-03 NOTE — PROGRESS NOTES
"Burt is a 44 year old who is being evaluated via a billable video visit.    How would you like to obtain your AVS? MyChart  If the video visit is dropped, the invitation should be resent by: Text to cell phone: [phone]  Will anyone else be joining your video visit? No      Assessment & Plan   Problem List Items Addressed This Visit    None  Visit Diagnoses       Influenza A    -  Primary    Viral syndrome               Patient had onset of symptoms on the 128, was seen on 130s in urgent care started on Tamiflu, took for 2 to 3 days as he reports developed side effect from that stop the medication seen in ER on 2 1 for diarrhea dehydration received IV fluid.  And was given Toradol for rib pain.  Currently reports he has been symptom-free for last 2 days was no cough fever GI symptoms or systemic symptoms.  Patient requesting letter to go back to work.  According guidelines he has been symptom-free for at least 24 hours and symptom onset of more than 5 days.  Patient would like to return to work tomorrow in 2025.  Letter written in this regard with clearance to return to work.     MED REC REQUIRED  Post Medication Reconciliation Status: discharge medications reconciled, continue medications without change  BMI  Estimated body mass index is 31.75 kg/m  as calculated from the following:    Height as of 25: 1.88 m (6' 2\").    Weight as of 25: 112.2 kg (247 lb 4.8 oz).       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  He was diagnosed with influenza A back on 89 years, was seen on the ER " on 2 1 for diarrhea and dehydration.  Received IV fluid.  Patient has been symptom-free since for the last 2 days.  He is a letter to return back to work.  He took a Tamiflu only for 2 to 3 days but he developed some side effects with headache diarrhea and he stopped he attributes that to Tamiflu.  Currently denies any diarrhea vomiting headache fever or worsening cough.  He is taking Toradol here drip for pain as well.  He reports his symptoms have abated over the last 2 days.    Review of Systems  Constitutional, HEENT, cardiovascular, pulmonary, gi and gu systems are negative, except as otherwise noted.      Objective           Vitals:  No vitals were obtained today due to virtual visit.    Physical Exam   GENERAL: alert and no distress  EYES: Eyes grossly normal to inspection.  No discharge or erythema, or obvious scleral/conjunctival abnormalities.  RESP: No audible wheeze, cough, or visible cyanosis.    SKIN: Visible skin clear. No significant rash, abnormal pigmentation or lesions.  NEURO: Cranial nerves grossly intact.  Mentation and speech appropriate for age.  PSYCH: Appropriate affect, tone, and pace of words    Admission on 02/01/2025, Discharged on 02/01/2025   Component Date Value Ref Range Status    Sodium 02/01/2025 137  135 - 145 mmol/L Final    Potassium 02/01/2025 3.9  3.4 - 5.3 mmol/L Final    Carbon Dioxide (CO2) 02/01/2025 26  22 - 29 mmol/L Final    Anion Gap 02/01/2025 13  7 - 15 mmol/L Final    Urea Nitrogen 02/01/2025 9.6  6.0 - 20.0 mg/dL Final    Creatinine 02/01/2025 0.85  0.67 - 1.17 mg/dL Final    GFR Estimate 02/01/2025 >90  >60 mL/min/1.73m2 Final    eGFR calculated using 2021 CKD-EPI equation.    Calcium 02/01/2025 9.3  8.8 - 10.4 mg/dL Final    Chloride 02/01/2025 98  98 - 107 mmol/L Final    Glucose 02/01/2025 91  70 - 99 mg/dL Final    Alkaline Phosphatase 02/01/2025 60  40 - 150 U/L Final    AST 02/01/2025 73 (H)  0 - 45 U/L Final    ALT 02/01/2025 77 (H)  0 - 70 U/L Final     Protein Total 02/01/2025 7.6  6.4 - 8.3 g/dL Final    Albumin 02/01/2025 4.4  3.5 - 5.2 g/dL Final    Bilirubin Total 02/01/2025 0.6  <=1.2 mg/dL Final    WBC Count 02/01/2025 4.1  4.0 - 11.0 10e3/uL Final    RBC Count 02/01/2025 5.30  4.40 - 5.90 10e6/uL Final    Hemoglobin 02/01/2025 16.3  13.3 - 17.7 g/dL Final    Hematocrit 02/01/2025 47.5  40.0 - 53.0 % Final    MCV 02/01/2025 90  78 - 100 fL Final    MCH 02/01/2025 30.8  26.5 - 33.0 pg Final    MCHC 02/01/2025 34.3  31.5 - 36.5 g/dL Final    RDW 02/01/2025 12.2  10.0 - 15.0 % Final    Platelet Count 02/01/2025 169  150 - 450 10e3/uL Final    % Neutrophils 02/01/2025 57  % Final    % Lymphocytes 02/01/2025 28  % Final    % Monocytes 02/01/2025 14  % Final    % Eosinophils 02/01/2025 0  % Final    % Basophils 02/01/2025 1  % Final    % Immature Granulocytes 02/01/2025 0  % Final    NRBCs per 100 WBC 02/01/2025 0  <1 /100 Final    Absolute Neutrophils 02/01/2025 2.4  1.6 - 8.3 10e3/uL Final    Absolute Lymphocytes 02/01/2025 1.2  0.8 - 5.3 10e3/uL Final    Absolute Monocytes 02/01/2025 0.6  0.0 - 1.3 10e3/uL Final    Absolute Eosinophils 02/01/2025 0.0  0.0 - 0.7 10e3/uL Final    Absolute Basophils 02/01/2025 0.0  0.0 - 0.2 10e3/uL Final    Absolute Immature Granulocytes 02/01/2025 0.0  <=0.4 10e3/uL Final    Absolute NRBCs 02/01/2025 0.0  10e3/uL Final    Procalcitonin 02/01/2025 0.10  <0.50 ng/mL Final    Comment: Interpretation and Recommendations     <0.5 ng/mL:   Systemic bacterial infection unlikely. Local bacterial infection is possible.     0.5-1.99 ng/mL:   Systemic bacterial infection possible, but various other conditions are known to induce PCT as well.     >=2.00 ng/mL:   Systemic bacterial infection likely, unless other causes are known.     Decision to start antibiotics should not be based on procalcitonin level alone. See Procalcitonin Guidance document for more details.  https://[employer]Usage/files/[facility]/documents/adult-procalcitonin-guidance-on-xzteeqgzgcj83133.pdf    Factors that may affect PCT levels (not all-inclusive):     - Increased PCT level           Severe trauma/burns           Invasive surgery           Cooling therapy after cardiac arrest/surgery           Treatment with agents which stimulate cytokines           Acute kidney injury           Chronic kidney disease and end stage renal disease           Acute graft vs host disease           Non-specific shock                            causing decreased organ perfusion and/or infarction       - Normal or unchanged PCT level           Early in infections (if low and infection is suspected, repeating in 6-12 hours is recommended)           Chronic infections (endocarditis, osteomyelitis, prosthetic device/graft infections)           Localized infections (cellulitis, wound infections, intra-abdominal abscess)     Note: PCT has not been extensively studied in pregnancy/breastfeeding, pediatrics, severe immunosuppression, and cystic fibrosis.         Video-Visit Details    Type of service:  Video Visit   Originating Location (pt. Location): Home    Distant Location (provider location):  On-site  Platform used for Video Visit: Beata  Signed Electronically by: Jillian Jo MD

## 2025-06-21 ENCOUNTER — HEALTH MAINTENANCE LETTER (OUTPATIENT)
Age: 45
End: 2025-06-21